# Patient Record
Sex: MALE | Race: BLACK OR AFRICAN AMERICAN | NOT HISPANIC OR LATINO | Employment: OTHER | URBAN - METROPOLITAN AREA
[De-identification: names, ages, dates, MRNs, and addresses within clinical notes are randomized per-mention and may not be internally consistent; named-entity substitution may affect disease eponyms.]

---

## 2019-07-18 ENCOUNTER — APPOINTMENT (OUTPATIENT)
Dept: CT IMAGING | Facility: HOSPITAL | Age: 74
End: 2019-07-18

## 2019-07-18 ENCOUNTER — APPOINTMENT (OUTPATIENT)
Dept: GENERAL RADIOLOGY | Facility: HOSPITAL | Age: 74
End: 2019-07-18

## 2019-07-18 ENCOUNTER — HOSPITAL ENCOUNTER (EMERGENCY)
Facility: HOSPITAL | Age: 74
Discharge: HOME | End: 2019-07-19
Attending: EMERGENCY MEDICINE | Admitting: EMERGENCY MEDICINE

## 2019-07-18 DIAGNOSIS — S30.1XXA ABDOMINAL WALL SEROMA, INITIAL ENCOUNTER: Primary | ICD-10-CM

## 2019-07-18 LAB
ALBUMIN SERPL-MCNC: 4.4 G/DL (ref 3.5–5.2)
ALBUMIN/GLOB SERPL: 1.1 G/DL
ALP SERPL-CCNC: 86 U/L (ref 39–117)
ALT SERPL W P-5'-P-CCNC: 89 U/L (ref 1–41)
ANION GAP SERPL CALCULATED.3IONS-SCNC: 18.4 MMOL/L (ref 5–15)
AST SERPL-CCNC: 81 U/L (ref 1–40)
BASOPHILS # BLD AUTO: 0.04 10*3/MM3 (ref 0–0.2)
BASOPHILS NFR BLD AUTO: 0.4 % (ref 0–1.5)
BILIRUB SERPL-MCNC: 0.5 MG/DL (ref 0.2–1.2)
BILIRUB UR QL STRIP: NEGATIVE
BUN BLD-MCNC: 8 MG/DL (ref 8–23)
BUN/CREAT SERPL: 8.1 (ref 7–25)
CALCIUM SPEC-SCNC: 9.7 MG/DL (ref 8.6–10.5)
CHLORIDE SERPL-SCNC: 97 MMOL/L (ref 98–107)
CLARITY UR: CLEAR
CO2 SERPL-SCNC: 22.6 MMOL/L (ref 22–29)
COLOR UR: YELLOW
CREAT BLD-MCNC: 0.99 MG/DL (ref 0.76–1.27)
DEPRECATED RDW RBC AUTO: 45.3 FL (ref 37–54)
EOSINOPHIL # BLD AUTO: 0 10*3/MM3 (ref 0–0.4)
EOSINOPHIL NFR BLD AUTO: 0 % (ref 0.3–6.2)
ERYTHROCYTE [DISTWIDTH] IN BLOOD BY AUTOMATED COUNT: 13.8 % (ref 12.3–15.4)
GFR SERPL CREATININE-BSD FRML MDRD: 90 ML/MIN/1.73
GLOBULIN UR ELPH-MCNC: 3.9 GM/DL
GLUCOSE BLD-MCNC: 116 MG/DL (ref 65–99)
GLUCOSE UR STRIP-MCNC: NEGATIVE MG/DL
HCT VFR BLD AUTO: 43.7 % (ref 37.5–51)
HGB BLD-MCNC: 14.5 G/DL (ref 13–17.7)
HGB UR QL STRIP.AUTO: NEGATIVE
IMM GRANULOCYTES # BLD AUTO: 0.03 10*3/MM3 (ref 0–0.05)
IMM GRANULOCYTES NFR BLD AUTO: 0.3 % (ref 0–0.5)
INR PPP: 1.04 (ref 0.9–1.1)
KETONES UR QL STRIP: ABNORMAL
LEUKOCYTE ESTERASE UR QL STRIP.AUTO: NEGATIVE
LYMPHOCYTES # BLD AUTO: 3.25 10*3/MM3 (ref 0.7–3.1)
LYMPHOCYTES NFR BLD AUTO: 29.4 % (ref 19.6–45.3)
MCH RBC QN AUTO: 29.8 PG (ref 26.6–33)
MCHC RBC AUTO-ENTMCNC: 33.2 G/DL (ref 31.5–35.7)
MCV RBC AUTO: 89.9 FL (ref 79–97)
MONOCYTES # BLD AUTO: 0.68 10*3/MM3 (ref 0.1–0.9)
MONOCYTES NFR BLD AUTO: 6.2 % (ref 5–12)
NEUTROPHILS # BLD AUTO: 7.04 10*3/MM3 (ref 1.7–7)
NEUTROPHILS NFR BLD AUTO: 63.7 % (ref 42.7–76)
NITRITE UR QL STRIP: NEGATIVE
NRBC BLD AUTO-RTO: 0 /100 WBC (ref 0–0.2)
NT-PROBNP SERPL-MCNC: 50.8 PG/ML (ref 5–900)
PH UR STRIP.AUTO: 6 [PH] (ref 5–8)
PLATELET # BLD AUTO: 169 10*3/MM3 (ref 140–450)
PMV BLD AUTO: 10.7 FL (ref 6–12)
POTASSIUM BLD-SCNC: 3.8 MMOL/L (ref 3.5–5.2)
PROT SERPL-MCNC: 8.3 G/DL (ref 6–8.5)
PROT UR QL STRIP: NEGATIVE
PROTHROMBIN TIME: 13.3 SECONDS (ref 11.7–14.2)
RBC # BLD AUTO: 4.86 10*6/MM3 (ref 4.14–5.8)
SODIUM BLD-SCNC: 138 MMOL/L (ref 136–145)
SP GR UR STRIP: 1.02 (ref 1–1.03)
TROPONIN T SERPL-MCNC: <0.01 NG/ML (ref 0–0.03)
UROBILINOGEN UR QL STRIP: ABNORMAL
WBC NRBC COR # BLD: 11.04 10*3/MM3 (ref 3.4–10.8)

## 2019-07-18 PROCEDURE — 71046 X-RAY EXAM CHEST 2 VIEWS: CPT

## 2019-07-18 PROCEDURE — 93005 ELECTROCARDIOGRAM TRACING: CPT | Performed by: EMERGENCY MEDICINE

## 2019-07-18 PROCEDURE — 81003 URINALYSIS AUTO W/O SCOPE: CPT | Performed by: NURSE PRACTITIONER

## 2019-07-18 PROCEDURE — 80053 COMPREHEN METABOLIC PANEL: CPT | Performed by: NURSE PRACTITIONER

## 2019-07-18 PROCEDURE — 85025 COMPLETE CBC W/AUTO DIFF WBC: CPT | Performed by: NURSE PRACTITIONER

## 2019-07-18 PROCEDURE — 83880 ASSAY OF NATRIURETIC PEPTIDE: CPT | Performed by: NURSE PRACTITIONER

## 2019-07-18 PROCEDURE — 96374 THER/PROPH/DIAG INJ IV PUSH: CPT

## 2019-07-18 PROCEDURE — 25010000002 LORAZEPAM PER 2 MG: Performed by: NURSE PRACTITIONER

## 2019-07-18 PROCEDURE — 93010 ELECTROCARDIOGRAM REPORT: CPT | Performed by: INTERNAL MEDICINE

## 2019-07-18 PROCEDURE — 74177 CT ABD & PELVIS W/CONTRAST: CPT

## 2019-07-18 PROCEDURE — 71275 CT ANGIOGRAPHY CHEST: CPT

## 2019-07-18 PROCEDURE — 84484 ASSAY OF TROPONIN QUANT: CPT | Performed by: NURSE PRACTITIONER

## 2019-07-18 PROCEDURE — 85610 PROTHROMBIN TIME: CPT | Performed by: NURSE PRACTITIONER

## 2019-07-18 PROCEDURE — 93005 ELECTROCARDIOGRAM TRACING: CPT

## 2019-07-18 PROCEDURE — 99283 EMERGENCY DEPT VISIT LOW MDM: CPT

## 2019-07-18 RX ORDER — AMLODIPINE BESYLATE 5 MG/1
5 TABLET ORAL DAILY
COMMUNITY
End: 2019-08-16

## 2019-07-18 RX ORDER — LORAZEPAM 2 MG/ML
0.5 INJECTION INTRAMUSCULAR ONCE
Status: COMPLETED | OUTPATIENT
Start: 2019-07-18 | End: 2019-07-18

## 2019-07-18 RX ORDER — BENAZEPRIL HYDROCHLORIDE 20 MG/1
20 TABLET ORAL DAILY
COMMUNITY
End: 2021-04-22

## 2019-07-18 RX ORDER — SODIUM CHLORIDE 0.9 % (FLUSH) 0.9 %
10 SYRINGE (ML) INJECTION AS NEEDED
Status: DISCONTINUED | OUTPATIENT
Start: 2019-07-18 | End: 2019-07-19 | Stop reason: HOSPADM

## 2019-07-18 RX ORDER — SIMVASTATIN 20 MG
20 TABLET ORAL NIGHTLY
COMMUNITY
End: 2021-04-22

## 2019-07-18 RX ORDER — ASPIRIN 81 MG/1
324 TABLET, CHEWABLE ORAL ONCE
Status: DISCONTINUED | OUTPATIENT
Start: 2019-07-18 | End: 2019-07-19 | Stop reason: HOSPADM

## 2019-07-18 RX ADMIN — SODIUM CHLORIDE 1000 ML: 9 INJECTION, SOLUTION INTRAVENOUS at 23:20

## 2019-07-18 RX ADMIN — LORAZEPAM 0.5 MG: 2 INJECTION INTRAMUSCULAR; INTRAVENOUS at 23:41

## 2019-07-19 VITALS
OXYGEN SATURATION: 94 % | DIASTOLIC BLOOD PRESSURE: 71 MMHG | TEMPERATURE: 97.4 F | HEIGHT: 63 IN | RESPIRATION RATE: 16 BRPM | HEART RATE: 90 BPM | SYSTOLIC BLOOD PRESSURE: 134 MMHG

## 2019-07-19 PROCEDURE — 0 IOPAMIDOL PER 1 ML: Performed by: EMERGENCY MEDICINE

## 2019-07-19 RX ORDER — CEPHALEXIN 500 MG/1
500 CAPSULE ORAL 4 TIMES DAILY
Qty: 20 CAPSULE | Refills: 0 | Status: SHIPPED | OUTPATIENT
Start: 2019-07-19 | End: 2019-07-24

## 2019-07-19 RX ADMIN — IOPAMIDOL 95 ML: 755 INJECTION, SOLUTION INTRAVENOUS at 00:15

## 2019-07-30 ENCOUNTER — OFFICE VISIT (OUTPATIENT)
Dept: SURGERY | Facility: CLINIC | Age: 74
End: 2019-07-30

## 2019-07-30 VITALS — OXYGEN SATURATION: 98 % | BODY MASS INDEX: 31.4 KG/M2 | HEART RATE: 93 BPM | WEIGHT: 177.2 LBS | HEIGHT: 63 IN

## 2019-07-30 DIAGNOSIS — T85.79XA INFECTED HERNIOPLASTY MESH, INITIAL ENCOUNTER (HCC): Primary | ICD-10-CM

## 2019-07-30 DIAGNOSIS — Z12.11 ENCOUNTER FOR SCREENING COLONOSCOPY: ICD-10-CM

## 2019-07-30 PROCEDURE — 99204 OFFICE O/P NEW MOD 45 MIN: CPT | Performed by: SURGERY

## 2019-07-30 RX ORDER — CHLORAL HYDRATE 500 MG
1000 CAPSULE ORAL
COMMUNITY

## 2019-07-30 RX ORDER — CEFAZOLIN SODIUM 2 G/100ML
2 INJECTION, SOLUTION INTRAVENOUS ONCE
Status: CANCELLED | OUTPATIENT
Start: 2019-08-21 | End: 2019-07-30

## 2019-07-30 NOTE — PROGRESS NOTES
Subjective   Reggie Coy is a 74 y.o. male who presents to the office in surgical consultation from Bon Abebe MD for abdominal pain with chronic abdominal drainage.    History of Present Illness     The patient had a previous appendectomy through a midline incision.  He then developed a ventral hernia in that incision and has undergone 2 separate ventral hernia repairs.  His most recent ventral hernia repair was in 2000.  He recovered well from that surgery but does have some intermittent pain involving the midline incision.  Over the past several months he has developed chronic drainage from 2 separate sinus tracts along the abdominal wall.  The drainage is clear at times and cloudy at times.  The pain has also progressively worsened with the drainage.  He was seen in the emergency room on 7/18/2019 with constitutional symptoms including chest pain with shortness of breath and generalized fatigue.  Evaluation was basically normal regarding his heart.  A CT scan of the abdomen and pelvis was performed that shows the mesh from the previous ventral hernia repair with a complicated fluid collection surrounding the mesh.    The patient has a previous history of colonic polyps.  He is not sure of his most recent colonoscopy but believes it was 8 to 10 years ago.  He has not had any diarrhea or constipation.  There has been no melena nor rectal bleeding.    Review of Systems   Constitutional: Negative for activity change, appetite change, fatigue and fever.   HENT: Negative for trouble swallowing and voice change.    Respiratory: Negative for chest tightness and shortness of breath.    Cardiovascular: Negative for chest pain and palpitations.   Gastrointestinal: Positive for abdominal pain. Negative for blood in stool, constipation, diarrhea, nausea and vomiting.   Endocrine: Negative for cold intolerance and heat intolerance.   Genitourinary: Negative for dysuria and flank pain.   Neurological: Negative for  dizziness and light-headedness.   Hematological: Negative for adenopathy. Does not bruise/bleed easily.   Psychiatric/Behavioral: Negative for agitation and confusion.     Past Medical History:   Diagnosis Date   • Hyperlipidemia    • Hypertension      Past Surgical History:   Procedure Laterality Date   • APPENDECTOMY     • HERNIA REPAIR       Family History   Problem Relation Age of Onset   • Heart disease Mother      Social History     Socioeconomic History   • Marital status:      Spouse name: Not on file   • Number of children: Not on file   • Years of education: Not on file   • Highest education level: Not on file   Tobacco Use   • Smoking status: Never Smoker   Substance and Sexual Activity   • Alcohol use: Yes     Comment: Occasional use   • Drug use: Yes     Types: Marijuana       Objective   Physical Exam   Constitutional: He is oriented to person, place, and time. He appears well-developed and well-nourished.  Non-toxic appearance.   HENT:   Head: Normocephalic and atraumatic.   Eyes: EOM are normal. No scleral icterus.   Neck: Normal range of motion. Neck supple.   Pulmonary/Chest: Effort normal. No respiratory distress.   Abdominal: Soft. Normal appearance. There is tenderness (Mild tenderness along the midline incision) in the periumbilical area.   There is a midline incision that is firm to palpation with 2 sinus tracts present.  There is no fluctuance nor surrounding cellulitis.   Neurological: He is alert and oriented to person, place, and time.   Skin: Skin is warm and dry.   Psychiatric: He has a normal mood and affect. His behavior is normal. Judgment and thought content normal.       Assessment/Plan       The primary encounter diagnosis was Infected hernioplasty mesh, initial encounter (CMS/[facility]). A diagnosis of Encounter for screening colonoscopy was also pertinent to this visit.    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  This  will require an abdominal wall exploration with removal of mesh and then a ventral hernia repair, likely requiring component separation with mesh placement.    The patient has a history of colonic polyps and has not had a colonoscopy for 8 to 10 years.  He will need a colonoscopy prior to any abdominal surgeries.    The entire situation was discussed with the patient and his wife.  It was explained that the surgery is a large abdominal surgery that will require a significant recovery including at least a several day hospital stay.  The patient understands the indications, alternatives, risks, and benefits of the procedure and wishes to proceed.

## 2019-08-08 ENCOUNTER — HOSPITAL ENCOUNTER (OUTPATIENT)
Facility: HOSPITAL | Age: 74
Setting detail: HOSPITAL OUTPATIENT SURGERY
Discharge: HOME | End: 2019-08-08
Attending: SURGERY | Admitting: SURGERY

## 2019-08-08 ENCOUNTER — ANESTHESIA (OUTPATIENT)
Dept: GASTROENTEROLOGY | Facility: HOSPITAL | Age: 74
End: 2019-08-08

## 2019-08-08 ENCOUNTER — ANESTHESIA EVENT (OUTPATIENT)
Dept: GASTROENTEROLOGY | Facility: HOSPITAL | Age: 74
End: 2019-08-08

## 2019-08-08 VITALS
RESPIRATION RATE: 16 BRPM | SYSTOLIC BLOOD PRESSURE: 122 MMHG | HEART RATE: 80 BPM | DIASTOLIC BLOOD PRESSURE: 69 MMHG | WEIGHT: 170 LBS | HEIGHT: 63 IN | OXYGEN SATURATION: 99 % | BODY MASS INDEX: 30.12 KG/M2

## 2019-08-08 PROBLEM — Z86.010 HISTORY OF COLON POLYPS: Status: ACTIVE | Noted: 2019-08-08

## 2019-08-08 PROCEDURE — G0105 COLORECTAL SCRN; HI RISK IND: HCPCS | Performed by: SURGERY

## 2019-08-08 PROCEDURE — 25010000002 PROPOFOL 10 MG/ML EMULSION: Performed by: NURSE ANESTHETIST, CERTIFIED REGISTERED

## 2019-08-08 PROCEDURE — 25010000002 PHENYLEPHRINE PER 1 ML: Performed by: NURSE ANESTHETIST, CERTIFIED REGISTERED

## 2019-08-08 RX ORDER — SODIUM CHLORIDE 0.9 % (FLUSH) 0.9 %
3 SYRINGE (ML) INJECTION AS NEEDED
Status: DISCONTINUED | OUTPATIENT
Start: 2019-08-08 | End: 2019-08-08 | Stop reason: HOSPADM

## 2019-08-08 RX ORDER — PROMETHAZINE HYDROCHLORIDE 25 MG/1
25 SUPPOSITORY RECTAL ONCE AS NEEDED
Status: DISCONTINUED | OUTPATIENT
Start: 2019-08-08 | End: 2019-08-08 | Stop reason: HOSPADM

## 2019-08-08 RX ORDER — LIDOCAINE HYDROCHLORIDE 10 MG/ML
0.5 INJECTION, SOLUTION INFILTRATION; PERINEURAL ONCE AS NEEDED
Status: DISCONTINUED | OUTPATIENT
Start: 2019-08-08 | End: 2019-08-08 | Stop reason: HOSPADM

## 2019-08-08 RX ORDER — SODIUM CHLORIDE, SODIUM LACTATE, POTASSIUM CHLORIDE, CALCIUM CHLORIDE 600; 310; 30; 20 MG/100ML; MG/100ML; MG/100ML; MG/100ML
1000 INJECTION, SOLUTION INTRAVENOUS CONTINUOUS
Status: DISCONTINUED | OUTPATIENT
Start: 2019-08-08 | End: 2019-08-08 | Stop reason: HOSPADM

## 2019-08-08 RX ORDER — PROMETHAZINE HYDROCHLORIDE 25 MG/ML
12.5 INJECTION, SOLUTION INTRAMUSCULAR; INTRAVENOUS ONCE AS NEEDED
Status: DISCONTINUED | OUTPATIENT
Start: 2019-08-08 | End: 2019-08-08 | Stop reason: HOSPADM

## 2019-08-08 RX ORDER — PROPOFOL 10 MG/ML
VIAL (ML) INTRAVENOUS CONTINUOUS PRN
Status: DISCONTINUED | OUTPATIENT
Start: 2019-08-08 | End: 2019-08-08 | Stop reason: SURG

## 2019-08-08 RX ORDER — LIDOCAINE HYDROCHLORIDE 20 MG/ML
INJECTION, SOLUTION INFILTRATION; PERINEURAL AS NEEDED
Status: DISCONTINUED | OUTPATIENT
Start: 2019-08-08 | End: 2019-08-08 | Stop reason: SURG

## 2019-08-08 RX ORDER — PROPOFOL 10 MG/ML
VIAL (ML) INTRAVENOUS AS NEEDED
Status: DISCONTINUED | OUTPATIENT
Start: 2019-08-08 | End: 2019-08-08 | Stop reason: SURG

## 2019-08-08 RX ORDER — PROMETHAZINE HYDROCHLORIDE 25 MG/1
25 TABLET ORAL ONCE AS NEEDED
Status: DISCONTINUED | OUTPATIENT
Start: 2019-08-08 | End: 2019-08-08 | Stop reason: HOSPADM

## 2019-08-08 RX ADMIN — PROPOFOL 50 MG: 10 INJECTION, EMULSION INTRAVENOUS at 10:30

## 2019-08-08 RX ADMIN — PHENYLEPHRINE HYDROCHLORIDE 100 MCG: 10 INJECTION INTRAVENOUS at 10:42

## 2019-08-08 RX ADMIN — PHENYLEPHRINE HYDROCHLORIDE 100 MCG: 10 INJECTION INTRAVENOUS at 10:47

## 2019-08-08 RX ADMIN — LIDOCAINE HYDROCHLORIDE 60 MG: 20 INJECTION, SOLUTION INFILTRATION; PERINEURAL at 10:28

## 2019-08-08 RX ADMIN — SODIUM CHLORIDE, POTASSIUM CHLORIDE, SODIUM LACTATE AND CALCIUM CHLORIDE 1000 ML: 600; 310; 30; 20 INJECTION, SOLUTION INTRAVENOUS at 09:20

## 2019-08-08 RX ADMIN — PHENYLEPHRINE HYDROCHLORIDE 100 MCG: 10 INJECTION INTRAVENOUS at 10:52

## 2019-08-08 RX ADMIN — PROPOFOL 200 MCG/KG/MIN: 10 INJECTION, EMULSION INTRAVENOUS at 10:28

## 2019-08-08 RX ADMIN — PROPOFOL 50 MG: 10 INJECTION, EMULSION INTRAVENOUS at 10:32

## 2019-08-08 RX ADMIN — PROPOFOL 100 MG: 10 INJECTION, EMULSION INTRAVENOUS at 10:28

## 2019-08-08 RX ADMIN — PHENYLEPHRINE HYDROCHLORIDE 100 MCG: 10 INJECTION INTRAVENOUS at 10:37

## 2019-08-08 NOTE — ANESTHESIA PREPROCEDURE EVALUATION
Anesthesia Evaluation     Patient summary reviewed and Nursing notes reviewed   NPO Solid Status: > 8 hours  NPO Liquid Status: > 4 hours           Airway   Dental      Pulmonary    Cardiovascular     (+) hypertension 2 medications or greater,       Neuro/Psych  GI/Hepatic/Renal/Endo      Musculoskeletal     Abdominal    Substance History      OB/GYN          Other                        Anesthesia Plan    ASA 3     MAC     Anesthetic plan, all risks, benefits, and alternatives have been provided, discussed and informed consent has been obtained with: patient.

## 2019-08-08 NOTE — DISCHARGE INSTRUCTIONS
Colonoscopy, Adult, Care After  DR PERKINS [phone]  This sheet gives you information about how to care for yourself after your procedure. Your health care provider may also give you more specific instructions. If you have problems or questions, contact your health care provider.  What can I expect after the procedure?  After the procedure, it is common to have:  · A small amount of blood in your stool for 24 hours after the procedure.  · Some gas.  · Mild abdominal cramping or bloating.  Follow these instructions at home:  General instructions  · For the first 24 hours after the procedure:  ? Do not drive or use machinery.  ? Do not sign important documents.  ? Do not drink alcohol.  ? Do your regular daily activities at a slower pace than normal.  ? Eat soft, easy-to-digest foods.  · Take over-the-counter or prescription medicines only as told by your health care provider.  Relieving cramping and bloating    · Try walking around when you have cramps or feel bloated.  · Apply heat to your abdomen as told by your health care provider. Use a heat source that your health care provider recommends, such as a moist heat pack or a heating pad.  ? Place a towel between your skin and the heat source.  ? Leave the heat on for 20-30 minutes.  ? Remove the heat if your skin turns bright red. This is especially important if you are unable to feel pain, heat, or cold. You may have a greater risk of getting burned.  Eating and drinking    · Drink enough fluid to keep your urine pale yellow.  · Resume your normal diet as instructed by your health care provider. Avoid heavy or fried foods that are hard to digest.  · Avoid drinking alcohol for as long as instructed by your health care provider.  Contact a health care provider if:  · You have blood in your stool 2-3 days after the procedure.  Get help right away if:  · You have more than a small spotting of blood in your stool.  · You pass large blood clots in your stool.  · Your  abdomen is swollen.  · You have nausea or vomiting.  · You have a fever.  · You have increasing abdominal pain that is not relieved with medicine.  Summary  · After the procedure, it is common to have a small amount of blood in your stool. You may also have mild abdominal cramping and bloating.  · For the first 24 hours after the procedure, do not drive or use machinery, sign important documents, or drink alcohol.  · Contact your health care provider if you have a lot of blood in your stool, nausea or vomiting, a fever, or increased abdominal pain.  This information is not intended to replace advice given to you by your health care provider. Make sure you discuss any questions you have with your health care provider.  Document Released: 08/01/2005 Document Revised: 10/10/2018 Document Reviewed: 02/28/2017  [employer] Interactive Patient Education © 2019 [employer]

## 2019-08-08 NOTE — ANESTHESIA POSTPROCEDURE EVALUATION
"Patient: Reggie Coy    Procedure Summary     Date:  08/08/19 Room / Location:  [facility] ENDOSCOPY 8 /  [facility]    Anesthesia Start:  1023 Anesthesia Stop:  1058    Procedure:  COLONOSCOPY to Cecum (N/A ) Diagnosis:       Encounter for screening colonoscopy      (Encounter for screening colonoscopy [Z12.11])    Surgeon:  Bon Kong Jr., MD Provider:  Amy Enriquez MD    Anesthesia Type:  MAC ASA Status:  3          Anesthesia Type: MAC  Last vitals  BP   122/69 (08/08/19 1122)   Temp       Pulse   80 (08/08/19 1122)   Resp   16 (08/08/19 1122)     SpO2   99 % (08/08/19 1122)     Post Anesthesia Care and Evaluation    Patient location during evaluation: PACU  Patient participation: complete - patient participated  Level of consciousness: awake  Pain score: 0  Pain management: adequate  Airway patency: patent  Anesthetic complications: No anesthetic complications    Cardiovascular status: acceptable  Respiratory status: acceptable  Hydration status: acceptable    Comments: Blood pressure 122/69, pulse 80, resp. rate 16, height 160 cm (63\"), weight 77.1 kg (170 lb), SpO2 99 %.    No anesthesia care post op    "

## 2019-08-08 NOTE — OP NOTE
Surgeon:     Bon Kong Jr., M.D.    Preoperative Diagnosis:     1.  Need for screening colonoscopy  2.  History of colonic polyps    Postoperative Diagnosis:     1.  Diverticulosis    Procedure Performed:     1.  Colonoscopy    Indications:     The patient is a pleasant 74-year-old male with a history of colonic polyps.  He presents for screening colonoscopy at high risk group.  The patient understands the indications, alternatives, risks, and benefits of the procedure and wishes to proceed.    Procedure:     The patient was identified, taken to the endoscopy suite, and place in the left side down decubitus procedure.  The patient underwent a MAC anesthesia and was appropriately monitored through the case by the anesthesia personnel.  A rectal exam was performed that was normal.  The colonoscope was introduced into the rectum and advanced very carefully to the cecum that was identified by the cecal strap, ileocecal valve, and the appendiceal orifice.  The scope was then slowly withdrawn with careful circumferential examination of the mucosa performed.  The bowel prep was good allowing adequate visualization of mucosal surfaces.  The scope was withdrawn.  The patient tolerated the procedure well and was transferred the recovery area in stable condition.    Findings:    There was diverticulosis involving the sigmoid colon.  No other abnormalities were present.    Recommendations:     1.  High-fiber diet.  2.  Repeat colonoscopy in 5 years based on his history of colonic polyps.    Bon Kong Jr., M.D.

## 2019-08-16 ENCOUNTER — APPOINTMENT (OUTPATIENT)
Dept: PREADMISSION TESTING | Facility: HOSPITAL | Age: 74
End: 2019-08-16

## 2019-08-16 VITALS
TEMPERATURE: 97.6 F | SYSTOLIC BLOOD PRESSURE: 130 MMHG | HEART RATE: 88 BPM | WEIGHT: 170 LBS | RESPIRATION RATE: 20 BRPM | DIASTOLIC BLOOD PRESSURE: 73 MMHG | OXYGEN SATURATION: 95 % | HEIGHT: 63 IN | BODY MASS INDEX: 30.12 KG/M2

## 2019-08-16 LAB
ANION GAP SERPL CALCULATED.3IONS-SCNC: 16.3 MMOL/L (ref 5–15)
BUN BLD-MCNC: 6 MG/DL (ref 8–23)
BUN/CREAT SERPL: 7.2 (ref 7–25)
CALCIUM SPEC-SCNC: 8.7 MG/DL (ref 8.6–10.5)
CHLORIDE SERPL-SCNC: 100 MMOL/L (ref 98–107)
CO2 SERPL-SCNC: 23.7 MMOL/L (ref 22–29)
CREAT BLD-MCNC: 0.83 MG/DL (ref 0.76–1.27)
DEPRECATED RDW RBC AUTO: 51.2 FL (ref 37–54)
ERYTHROCYTE [DISTWIDTH] IN BLOOD BY AUTOMATED COUNT: 14.8 % (ref 12.3–15.4)
GFR SERPL CREATININE-BSD FRML MDRD: 110 ML/MIN/1.73
GLUCOSE BLD-MCNC: 85 MG/DL (ref 65–99)
HCT VFR BLD AUTO: 41.7 % (ref 37.5–51)
HGB BLD-MCNC: 13.7 G/DL (ref 13–17.7)
MCH RBC QN AUTO: 30.6 PG (ref 26.6–33)
MCHC RBC AUTO-ENTMCNC: 32.9 G/DL (ref 31.5–35.7)
MCV RBC AUTO: 93.1 FL (ref 79–97)
PLATELET # BLD AUTO: 200 10*3/MM3 (ref 140–450)
PMV BLD AUTO: 10.1 FL (ref 6–12)
POTASSIUM BLD-SCNC: 4.1 MMOL/L (ref 3.5–5.2)
RBC # BLD AUTO: 4.48 10*6/MM3 (ref 4.14–5.8)
SODIUM BLD-SCNC: 140 MMOL/L (ref 136–145)
WBC NRBC COR # BLD: 9 10*3/MM3 (ref 3.4–10.8)

## 2019-08-16 PROCEDURE — 36415 COLL VENOUS BLD VENIPUNCTURE: CPT

## 2019-08-16 PROCEDURE — 80048 BASIC METABOLIC PNL TOTAL CA: CPT | Performed by: SURGERY

## 2019-08-16 PROCEDURE — 85027 COMPLETE CBC AUTOMATED: CPT | Performed by: SURGERY

## 2019-08-16 RX ORDER — LANOLIN ALCOHOL/MO/W.PET/CERES
100 CREAM (GRAM) TOPICAL DAILY
COMMUNITY
Start: 2019-01-05 | End: 2022-10-17

## 2019-08-16 RX ORDER — ACETAMINOPHEN 500 MG
1 TABLET ORAL AS NEEDED
COMMUNITY
End: 2021-04-22

## 2019-08-16 RX ORDER — BENAZEPRIL HYDROCHLORIDE 10 MG/1
10 TABLET ORAL DAILY
COMMUNITY
End: 2019-08-16 | Stop reason: SDUPTHER

## 2019-08-16 RX ORDER — AMLODIPINE BESYLATE 5 MG/1
5 TABLET ORAL DAILY
COMMUNITY
Start: 2019-01-05 | End: 2021-04-22

## 2019-08-16 NOTE — DISCHARGE INSTRUCTIONS
Take the following medications the morning of surgery with a small sip of water: TAKE BLOOD PRESSURE MEDS AM OF SURGERY    PLEASE ARRIVE AT THE  HOSPITAL AT: 1:30 PM    General Instructions:  • Do not eat solid food after midnight the night before surgery.  • You may drink clear liquids day of surgery but must stop at least one hour before your hospital arrival time.  • It is beneficial for you to have a clear drink that contains carbohydrates the day of surgery.  We suggest a 12 to 20 ounce bottle of Gatorade or Powerade for non-diabetic patients or a 12 to 20 ounce bottle of G2 or Powerade Zero for diabetic patients. (Pediatric patients, are not advised to drink a 12 to 20 ounce carbohydrate drink)    Clear liquids are liquids you can see through.  Nothing red in color.     Plain water                               Sports drinks  Sodas                                   Gelatin (Jell-O)  Fruit juices without pulp such as white grape juice and apple juice  Popsicles that contain no fruit or yogurt  Tea or coffee (no cream or milk added)  Gatorade / Powerade  G2 / Powerade Zero    • Infants may have breast milk up to four hours before surgery.  • Infants drinking formula may drink formula up to six hours before surgery.   • Patients who avoid smoking, chewing tobacco and alcohol for 4 weeks prior to surgery have a reduced risk of post-operative complications.  Quit smoking as many days before surgery as you can.  • Do not smoke, use chewing tobacco or drink alcohol the day of surgery.   • If applicable bring your C-PAP/ BI-PAP machine.  • Bring any papers given to you in the doctor’s office.  • Wear clean comfortable clothes and socks.  • Do not wear contact lenses, false eyelashes or make-up.  Bring a case for your glasses.   • Bring crutches or walker if applicable.  • Remove all piercings.  Leave jewelry and any other valuables at home.  • Hair extensions with metal clips must be removed prior to surgery.  • The  Pre-Admission Testing nurse will instruct you to bring medications if unable to obtain an accurate list in Pre-Admission Testing.        If you were given a blood bank ID arm band remember to bring it with you the day of surgery.    Preventing a Surgical Site Infection:  • For 2 to 3 days before surgery, avoid shaving with a razor because the razor can irritate skin and make it easier to develop an infection.    • Any areas of open skin can increase the risk of a post-operative wound infection by allowing bacteria to enter and travel throughout the body.  Notify your surgeon if you have any skin wounds / rashes even if it is not near the expected surgical site.  The area will need assessed to determine if surgery should be delayed until it is healed.  • The night prior to surgery sleep in a clean bed with clean clothing.  Do not allow pets to sleep with you.  • Shower on the morning of surgery using a fresh bar of anti-bacterial soap (such as Dial) and clean washcloth.  Dry with a clean towel and dress in clean clothing.  • Ask your surgeon if you will be receiving antibiotics prior to surgery.  • Make sure you, your family, and all healthcare providers clean their hands with soap and water or an alcohol based hand  before caring for you or your wound.    Day of surgery:  Upon arrival, a Pre-op nurse and Anesthesiologist will review your health history, obtain vital signs, and answer questions you may have.  The only belongings needed at this time will be a list of your home medications and if applicable your C-PAP/BI-PAP machine.  If you are staying overnight your family can leave the rest of your belongings in the car and bring them to your room later.  A Pre-op nurse will start an IV and you may receive medication in preparation for surgery, including something to help you relax.  Your family will be able to see you in the Pre-op area.  While you are in surgery your family should notify the waiting room   if they leave the waiting room area and provide a contact phone number.    Please be aware that surgery does come with discomfort.  We want to make every effort to control your discomfort so please discuss any uncontrolled symptoms with your nurse.   Your doctor will most likely have prescribed pain medications.      If you are going home after surgery you will receive individualized written care instructions before being discharged.  A responsible adult must drive you to and from the hospital on the day of your surgery and stay with you for 24 hours.    If you are staying overnight following surgery, you will be transported to your hospital room following the recovery period.  [facility] has all private rooms.    You have received a list of surgical assistants for your reference.  If you have any questions please call Pre-Admission Testing at [phone].  Deductibles and co-payments are collected on the day of service. Please be prepared to pay the required co-pay, deductible or deposit on the day of service as defined by your plan.

## 2019-08-21 ENCOUNTER — ANESTHESIA EVENT (OUTPATIENT)
Dept: PERIOP | Facility: HOSPITAL | Age: 74
End: 2019-08-21

## 2019-08-21 ENCOUNTER — HOSPITAL ENCOUNTER (INPATIENT)
Facility: HOSPITAL | Age: 74
LOS: 1 days | Discharge: HOME | End: 2019-08-22
Attending: SURGERY | Admitting: SURGERY

## 2019-08-21 ENCOUNTER — ANESTHESIA (OUTPATIENT)
Dept: PERIOP | Facility: HOSPITAL | Age: 74
End: 2019-08-21

## 2019-08-21 DIAGNOSIS — T85.79XA INFECTED HERNIOPLASTY MESH, INITIAL ENCOUNTER (HCC): ICD-10-CM

## 2019-08-21 PROCEDURE — 25010000002 MIDAZOLAM PER 1 MG: Performed by: ANESTHESIOLOGY

## 2019-08-21 PROCEDURE — C9290 INJ, BUPIVACAINE LIPOSOME: HCPCS | Performed by: SURGERY

## 2019-08-21 PROCEDURE — 87205 SMEAR GRAM STAIN: CPT | Performed by: SURGERY

## 2019-08-21 PROCEDURE — 49402 REMOVE FOREIGN BODY ADBOMEN: CPT | Performed by: SURGERY

## 2019-08-21 PROCEDURE — 25010000002 ONDANSETRON PER 1 MG: Performed by: NURSE ANESTHETIST, CERTIFIED REGISTERED

## 2019-08-21 PROCEDURE — 25010000003 BUPIVACAINE LIPOSOME 1.3 % SUSPENSION: Performed by: SURGERY

## 2019-08-21 PROCEDURE — 0WPF0JZ REMOVAL OF SYNTHETIC SUBSTITUTE FROM ABDOMINAL WALL, OPEN APPROACH: ICD-10-PCS | Performed by: SURGERY

## 2019-08-21 PROCEDURE — 0DJ00ZZ INSPECTION OF UPPER INTESTINAL TRACT, OPEN APPROACH: ICD-10-PCS | Performed by: SURGERY

## 2019-08-21 PROCEDURE — 87186 SC STD MICRODIL/AGAR DIL: CPT | Performed by: SURGERY

## 2019-08-21 PROCEDURE — 87070 CULTURE OTHR SPECIMN AEROBIC: CPT | Performed by: SURGERY

## 2019-08-21 PROCEDURE — 25010000002 NEOSTIGMINE PER 0.5 MG: Performed by: NURSE ANESTHETIST, CERTIFIED REGISTERED

## 2019-08-21 PROCEDURE — 25010000002 FENTANYL CITRATE (PF) 100 MCG/2ML SOLUTION: Performed by: NURSE ANESTHETIST, CERTIFIED REGISTERED

## 2019-08-21 PROCEDURE — 25010000002 PROPOFOL 10 MG/ML EMULSION: Performed by: NURSE ANESTHETIST, CERTIFIED REGISTERED

## 2019-08-21 PROCEDURE — 25010000002 DEXAMETHASONE PER 1 MG: Performed by: NURSE ANESTHETIST, CERTIFIED REGISTERED

## 2019-08-21 PROCEDURE — 25010000003 CEFAZOLIN IN DEXTROSE 2-4 GM/100ML-% SOLUTION: Performed by: SURGERY

## 2019-08-21 RX ORDER — FENTANYL CITRATE 50 UG/ML
INJECTION, SOLUTION INTRAMUSCULAR; INTRAVENOUS AS NEEDED
Status: DISCONTINUED | OUTPATIENT
Start: 2019-08-21 | End: 2019-08-21 | Stop reason: SURG

## 2019-08-21 RX ORDER — LABETALOL HYDROCHLORIDE 5 MG/ML
5 INJECTION, SOLUTION INTRAVENOUS
Status: DISCONTINUED | OUTPATIENT
Start: 2019-08-21 | End: 2019-08-21 | Stop reason: HOSPADM

## 2019-08-21 RX ORDER — MIDAZOLAM HYDROCHLORIDE 1 MG/ML
2 INJECTION INTRAMUSCULAR; INTRAVENOUS
Status: DISCONTINUED | OUTPATIENT
Start: 2019-08-21 | End: 2019-08-21 | Stop reason: HOSPADM

## 2019-08-21 RX ORDER — NALOXONE HCL 0.4 MG/ML
0.2 VIAL (ML) INJECTION AS NEEDED
Status: DISCONTINUED | OUTPATIENT
Start: 2019-08-21 | End: 2019-08-21 | Stop reason: HOSPADM

## 2019-08-21 RX ORDER — ONDANSETRON 2 MG/ML
4 INJECTION INTRAMUSCULAR; INTRAVENOUS EVERY 6 HOURS PRN
Status: DISCONTINUED | OUTPATIENT
Start: 2019-08-21 | End: 2019-08-22 | Stop reason: HOSPADM

## 2019-08-21 RX ORDER — OXYCODONE HYDROCHLORIDE AND ACETAMINOPHEN 5; 325 MG/1; MG/1
1 TABLET ORAL EVERY 4 HOURS PRN
Status: DISCONTINUED | OUTPATIENT
Start: 2019-08-21 | End: 2019-08-22 | Stop reason: HOSPADM

## 2019-08-21 RX ORDER — OXYCODONE AND ACETAMINOPHEN 7.5; 325 MG/1; MG/1
1 TABLET ORAL ONCE AS NEEDED
Status: DISCONTINUED | OUTPATIENT
Start: 2019-08-21 | End: 2019-08-21 | Stop reason: HOSPADM

## 2019-08-21 RX ORDER — EPHEDRINE SULFATE 50 MG/ML
5 INJECTION, SOLUTION INTRAVENOUS ONCE AS NEEDED
Status: DISCONTINUED | OUTPATIENT
Start: 2019-08-21 | End: 2019-08-21 | Stop reason: HOSPADM

## 2019-08-21 RX ORDER — ONDANSETRON 4 MG/1
4 TABLET, FILM COATED ORAL EVERY 6 HOURS PRN
Status: DISCONTINUED | OUTPATIENT
Start: 2019-08-21 | End: 2019-08-22 | Stop reason: HOSPADM

## 2019-08-21 RX ORDER — HYDROMORPHONE HYDROCHLORIDE 1 MG/ML
0.5 INJECTION, SOLUTION INTRAMUSCULAR; INTRAVENOUS; SUBCUTANEOUS
Status: DISCONTINUED | OUTPATIENT
Start: 2019-08-21 | End: 2019-08-21 | Stop reason: HOSPADM

## 2019-08-21 RX ORDER — SODIUM CHLORIDE, SODIUM LACTATE, POTASSIUM CHLORIDE, CALCIUM CHLORIDE 600; 310; 30; 20 MG/100ML; MG/100ML; MG/100ML; MG/100ML
9 INJECTION, SOLUTION INTRAVENOUS CONTINUOUS
Status: DISCONTINUED | OUTPATIENT
Start: 2019-08-21 | End: 2019-08-21

## 2019-08-21 RX ORDER — PROMETHAZINE HYDROCHLORIDE 25 MG/1
25 TABLET ORAL ONCE AS NEEDED
Status: DISCONTINUED | OUTPATIENT
Start: 2019-08-21 | End: 2019-08-21 | Stop reason: HOSPADM

## 2019-08-21 RX ORDER — HYDROCODONE BITARTRATE AND ACETAMINOPHEN 7.5; 325 MG/1; MG/1
1 TABLET ORAL ONCE AS NEEDED
Status: DISCONTINUED | OUTPATIENT
Start: 2019-08-21 | End: 2019-08-21 | Stop reason: HOSPADM

## 2019-08-21 RX ORDER — DEXAMETHASONE SODIUM PHOSPHATE 10 MG/ML
INJECTION INTRAMUSCULAR; INTRAVENOUS AS NEEDED
Status: DISCONTINUED | OUTPATIENT
Start: 2019-08-21 | End: 2019-08-21 | Stop reason: SURG

## 2019-08-21 RX ORDER — BUPIVACAINE HYDROCHLORIDE AND EPINEPHRINE 5; 5 MG/ML; UG/ML
INJECTION, SOLUTION PERINEURAL AS NEEDED
Status: DISCONTINUED | OUTPATIENT
Start: 2019-08-21 | End: 2019-08-22 | Stop reason: HOSPADM

## 2019-08-21 RX ORDER — LIDOCAINE HYDROCHLORIDE 10 MG/ML
0.5 INJECTION, SOLUTION EPIDURAL; INFILTRATION; INTRACAUDAL; PERINEURAL ONCE AS NEEDED
Status: DISCONTINUED | OUTPATIENT
Start: 2019-08-21 | End: 2019-08-21 | Stop reason: HOSPADM

## 2019-08-21 RX ORDER — HYDRALAZINE HYDROCHLORIDE 20 MG/ML
5 INJECTION INTRAMUSCULAR; INTRAVENOUS
Status: DISCONTINUED | OUTPATIENT
Start: 2019-08-21 | End: 2019-08-21 | Stop reason: HOSPADM

## 2019-08-21 RX ORDER — FENTANYL CITRATE 50 UG/ML
50 INJECTION, SOLUTION INTRAMUSCULAR; INTRAVENOUS
Status: DISCONTINUED | OUTPATIENT
Start: 2019-08-21 | End: 2019-08-21 | Stop reason: HOSPADM

## 2019-08-21 RX ORDER — ONDANSETRON 2 MG/ML
4 INJECTION INTRAMUSCULAR; INTRAVENOUS ONCE AS NEEDED
Status: DISCONTINUED | OUTPATIENT
Start: 2019-08-21 | End: 2019-08-21 | Stop reason: HOSPADM

## 2019-08-21 RX ORDER — ACETAMINOPHEN 325 MG/1
650 TABLET ORAL ONCE AS NEEDED
Status: DISCONTINUED | OUTPATIENT
Start: 2019-08-21 | End: 2019-08-21 | Stop reason: HOSPADM

## 2019-08-21 RX ORDER — LIDOCAINE HYDROCHLORIDE 20 MG/ML
INJECTION, SOLUTION INFILTRATION; PERINEURAL AS NEEDED
Status: DISCONTINUED | OUTPATIENT
Start: 2019-08-21 | End: 2019-08-21 | Stop reason: SURG

## 2019-08-21 RX ORDER — SODIUM CHLORIDE, SODIUM LACTATE, POTASSIUM CHLORIDE, CALCIUM CHLORIDE 600; 310; 30; 20 MG/100ML; MG/100ML; MG/100ML; MG/100ML
50 INJECTION, SOLUTION INTRAVENOUS CONTINUOUS
Status: DISCONTINUED | OUTPATIENT
Start: 2019-08-21 | End: 2019-08-22

## 2019-08-21 RX ORDER — GLYCOPYRROLATE 0.2 MG/ML
INJECTION INTRAMUSCULAR; INTRAVENOUS AS NEEDED
Status: DISCONTINUED | OUTPATIENT
Start: 2019-08-21 | End: 2019-08-21 | Stop reason: SURG

## 2019-08-21 RX ORDER — FLUMAZENIL 0.1 MG/ML
0.2 INJECTION INTRAVENOUS AS NEEDED
Status: DISCONTINUED | OUTPATIENT
Start: 2019-08-21 | End: 2019-08-21 | Stop reason: HOSPADM

## 2019-08-21 RX ORDER — SODIUM CHLORIDE 0.9 % (FLUSH) 0.9 %
1-10 SYRINGE (ML) INJECTION AS NEEDED
Status: DISCONTINUED | OUTPATIENT
Start: 2019-08-21 | End: 2019-08-21 | Stop reason: HOSPADM

## 2019-08-21 RX ORDER — CEFAZOLIN SODIUM 2 G/100ML
2 INJECTION, SOLUTION INTRAVENOUS ONCE
Status: COMPLETED | OUTPATIENT
Start: 2019-08-21 | End: 2019-08-21

## 2019-08-21 RX ORDER — AMLODIPINE BESYLATE 5 MG/1
5 TABLET ORAL DAILY
Status: DISCONTINUED | OUTPATIENT
Start: 2019-08-21 | End: 2019-08-22 | Stop reason: HOSPADM

## 2019-08-21 RX ORDER — ONDANSETRON 2 MG/ML
INJECTION INTRAMUSCULAR; INTRAVENOUS AS NEEDED
Status: DISCONTINUED | OUTPATIENT
Start: 2019-08-21 | End: 2019-08-21 | Stop reason: SURG

## 2019-08-21 RX ORDER — LISINOPRIL 20 MG/1
20 TABLET ORAL
Status: DISCONTINUED | OUTPATIENT
Start: 2019-08-21 | End: 2019-08-22 | Stop reason: HOSPADM

## 2019-08-21 RX ORDER — DIPHENHYDRAMINE HYDROCHLORIDE 50 MG/ML
12.5 INJECTION INTRAMUSCULAR; INTRAVENOUS
Status: DISCONTINUED | OUTPATIENT
Start: 2019-08-21 | End: 2019-08-21 | Stop reason: HOSPADM

## 2019-08-21 RX ORDER — PROMETHAZINE HYDROCHLORIDE 25 MG/ML
12.5 INJECTION, SOLUTION INTRAMUSCULAR; INTRAVENOUS ONCE AS NEEDED
Status: DISCONTINUED | OUTPATIENT
Start: 2019-08-21 | End: 2019-08-21 | Stop reason: HOSPADM

## 2019-08-21 RX ORDER — ROCURONIUM BROMIDE 10 MG/ML
INJECTION, SOLUTION INTRAVENOUS AS NEEDED
Status: DISCONTINUED | OUTPATIENT
Start: 2019-08-21 | End: 2019-08-21 | Stop reason: SURG

## 2019-08-21 RX ORDER — FAMOTIDINE 10 MG/ML
20 INJECTION, SOLUTION INTRAVENOUS ONCE
Status: COMPLETED | OUTPATIENT
Start: 2019-08-21 | End: 2019-08-21

## 2019-08-21 RX ORDER — PROPOFOL 10 MG/ML
VIAL (ML) INTRAVENOUS AS NEEDED
Status: DISCONTINUED | OUTPATIENT
Start: 2019-08-21 | End: 2019-08-21 | Stop reason: SURG

## 2019-08-21 RX ORDER — DIPHENHYDRAMINE HCL 25 MG
25 CAPSULE ORAL
Status: DISCONTINUED | OUTPATIENT
Start: 2019-08-21 | End: 2019-08-21 | Stop reason: HOSPADM

## 2019-08-21 RX ORDER — MIDAZOLAM HYDROCHLORIDE 1 MG/ML
1 INJECTION INTRAMUSCULAR; INTRAVENOUS
Status: DISCONTINUED | OUTPATIENT
Start: 2019-08-21 | End: 2019-08-21 | Stop reason: HOSPADM

## 2019-08-21 RX ORDER — PROMETHAZINE HYDROCHLORIDE 25 MG/1
25 SUPPOSITORY RECTAL ONCE AS NEEDED
Status: DISCONTINUED | OUTPATIENT
Start: 2019-08-21 | End: 2019-08-21 | Stop reason: HOSPADM

## 2019-08-21 RX ORDER — PROMETHAZINE HYDROCHLORIDE 25 MG/ML
6.25 INJECTION, SOLUTION INTRAMUSCULAR; INTRAVENOUS
Status: DISCONTINUED | OUTPATIENT
Start: 2019-08-21 | End: 2019-08-21 | Stop reason: HOSPADM

## 2019-08-21 RX ORDER — NALOXONE HCL 0.4 MG/ML
0.1 VIAL (ML) INJECTION
Status: DISCONTINUED | OUTPATIENT
Start: 2019-08-21 | End: 2019-08-22 | Stop reason: HOSPADM

## 2019-08-21 RX ADMIN — OXYCODONE HYDROCHLORIDE AND ACETAMINOPHEN 1 TABLET: 5; 325 TABLET ORAL at 19:19

## 2019-08-21 RX ADMIN — LISINOPRIL 20 MG: 20 TABLET ORAL at 15:31

## 2019-08-21 RX ADMIN — FENTANYL CITRATE 50 MCG: 50 INJECTION INTRAMUSCULAR; INTRAVENOUS at 09:43

## 2019-08-21 RX ADMIN — ROCURONIUM BROMIDE 40 MG: 10 INJECTION INTRAVENOUS at 08:08

## 2019-08-21 RX ADMIN — PROPOFOL 200 MG: 10 INJECTION, EMULSION INTRAVENOUS at 08:08

## 2019-08-21 RX ADMIN — GLYCOPYRROLATE 0.6 MG: 0.2 INJECTION INTRAMUSCULAR; INTRAVENOUS at 09:32

## 2019-08-21 RX ADMIN — NEOSTIGMINE METHYLSULFATE 3 MG: 1 INJECTION INTRAMUSCULAR; INTRAVENOUS; SUBCUTANEOUS at 09:32

## 2019-08-21 RX ADMIN — MIDAZOLAM 1 MG: 1 INJECTION INTRAMUSCULAR; INTRAVENOUS at 06:59

## 2019-08-21 RX ADMIN — LIDOCAINE HYDROCHLORIDE 100 MG: 20 INJECTION, SOLUTION INFILTRATION; PERINEURAL at 08:08

## 2019-08-21 RX ADMIN — CEFAZOLIN SODIUM 2 G: 2 INJECTION, SOLUTION INTRAVENOUS at 08:12

## 2019-08-21 RX ADMIN — ROCURONIUM BROMIDE 10 MG: 10 INJECTION INTRAVENOUS at 08:52

## 2019-08-21 RX ADMIN — DEXAMETHASONE SODIUM PHOSPHATE 4 MG: 10 INJECTION INTRAMUSCULAR; INTRAVENOUS at 08:18

## 2019-08-21 RX ADMIN — SODIUM CHLORIDE, POTASSIUM CHLORIDE, SODIUM LACTATE AND CALCIUM CHLORIDE 50 ML/HR: 600; 310; 30; 20 INJECTION, SOLUTION INTRAVENOUS at 15:32

## 2019-08-21 RX ADMIN — SODIUM CHLORIDE, POTASSIUM CHLORIDE, SODIUM LACTATE AND CALCIUM CHLORIDE: 600; 310; 30; 20 INJECTION, SOLUTION INTRAVENOUS at 09:33

## 2019-08-21 RX ADMIN — ONDANSETRON 4 MG: 2 INJECTION INTRAMUSCULAR; INTRAVENOUS at 09:32

## 2019-08-21 RX ADMIN — SODIUM CHLORIDE, POTASSIUM CHLORIDE, SODIUM LACTATE AND CALCIUM CHLORIDE 9 ML/HR: 600; 310; 30; 20 INJECTION, SOLUTION INTRAVENOUS at 06:58

## 2019-08-21 RX ADMIN — AMLODIPINE BESYLATE 5 MG: 5 TABLET ORAL at 15:31

## 2019-08-21 RX ADMIN — FENTANYL CITRATE 100 MCG: 50 INJECTION INTRAMUSCULAR; INTRAVENOUS at 08:05

## 2019-08-21 RX ADMIN — FAMOTIDINE 20 MG: 10 INJECTION INTRAVENOUS at 06:58

## 2019-08-21 NOTE — PLAN OF CARE
Problem: Patient Care Overview  Goal: Plan of Care Review  Outcome: Ongoing (interventions implemented as appropriate)   08/21/19 1742   Coping/Psychosocial   Plan of Care Reviewed With patient   Plan of Care Review   Progress no change   OTHER   Outcome Summary Pt had infected hernioplasty mesh removed today. Todd in place, due to be taken out at 0600 on 8/22. No c/o pain. Pt wishing to eat real food soon. LR @ 50cc/hr. VSS. Will continue to monitor.     Goal: Individualization and Mutuality   08/21/19 1742   Individualization   Patient Specific Goals (Include Timeframe) advance diet as soon as poss   08/21/19 1742   Individualization   Patient Specific Goals (Include Timeframe) advance diet as soon as possible   Mutuality/Individual Preferences   What Information Would Help Us Give You More Personalized Care? patel kline       Problem: Infection, Risk/Actual (Adult)  Goal: Identify Related Risk Factors and Signs and Symptoms  Outcome: Ongoing (interventions implemented as appropriate)   08/21/19 1742   Infection, Risk/Actual (Adult)   Related Risk Factors (Infection, Risk/Actual) surgery/procedure     Goal: Infection Prevention/Resolution  Outcome: Ongoing (interventions implemented as appropriate)   08/21/19 1742   Infection, Risk/Actual (Adult)   Infection Prevention/Resolution making progress toward outcome

## 2019-08-21 NOTE — OP NOTE
Surgeon: Bon Kong Jr., M.D.    Assistant: Zohreh Lemus CFA    Pre-Operative Diagnosis:     Infected hernioplasty mesh, initial encounter (CMS/[facility]) [T85.79XA]    Post-Operative Diagnosis:    Post-Op Diagnosis Codes:     * Infected hernioplasty mesh, initial encounter (CMS/[facility]) [T85.79XA]    Procedure Performed:     Exploratory laparotomy with removal of infected mesh    Indications:     The patient is a pleasant 74-year-old male who had a ventral hernia repair with mesh more than 15 years ago and has developed a draining sinus suspicious for a mesh infection.  CT scan of the abdomen pelvis shows a complicated fluid collection in a subfascial position.  He presents for exploratory laparotomy with removal of infected mesh.  The patient understands the indications, alternatives, risks, and benefits of the procedure and wishes to proceed.    Procedure:     The patient was identified and taken to the operating room where he was placed in the supine position on the operating table.  He underwent general endotracheal anesthesia and was appropriate monitored throughout the case by the anesthesia personnel.  His abdomen was prepped and draped in standard surgical fashion.  Previous midline incision was opened with a scalpel carried through the skin into the subcutaneous tissue.  In the midportion of this incision there is a draining sinus which was excised in elliptical fashion.  The subcutaneous tissue was divided using bolus cautery down to the fascia and there was significant scarring to this entire area.  The fascia was opened through the scarring in an area where drainage was present.  Opening this allowed access into a subfascial collection that clearly contained infected mesh and fluid.  Cultures were taken.  The fascia was opened above this collection using both electrocautery to allow complete access to the mesh.  The mesh was removed by applying traction and dividing Prolene sutures that were tacking the mesh.   Once this was removed there was a second piece of mesh identified deeper in an intra-abdominal position.  This mesh was also infected and removed in the same fashion.  There is no evidence of bowel attached to the mesh.  Once removed, there was a peel present over the omentum and this was left in place.  The area was copiously irrigated with bacitracin irrigation fluid.  A 19 round Boogie drain was placed in the subfascial position in the midst of the peel and area of the collection superficial to the omentum.  The VALARIE drain was brought out through a right sided stab wound and secured at the skin with a 2-0 nylon suture.  The fascia was then reapproximated with 0 PDS sutures in an interrupted figure-of-eight fashion.  The area was infiltrated with 0.5% Marcaine with epinephrine and Exparel.  The subcutaneous tissue was reapproximated with 3-0 Vicryl suture in a running fashion.  The skin was closed with skin staples.  The sponge, needle, management counts were correct at the end of the case.  The patient tolerated procedure well was transferred to the recovery room in stable condition.    Estimated Blood Loss:      15 mL    Specimens:       Order Name Source Comment Collection Info Order Time   HARDWARE / FOREIGN BODY CULTURE Abdominal Wall  Collected By: Bon Kong Jr., MD 8/21/2019  9:17 AM   TISSUE PATHOLOGY EXAM Abdominal Wall  Collected By: Bon Kong Jr., MD 8/21/2019  8:43 AM       Bon Kong Jr., M.D.

## 2019-08-21 NOTE — ANESTHESIA PROCEDURE NOTES
Airway  Urgency: elective    Airway not difficult    General Information and Staff    Patient location during procedure: OR  Anesthesiologist: Dwaine Thapa MD  CRNA: Jaren Butler CRNA    Indications and Patient Condition  Indications for airway management: airway protection    Preoxygenated: yes  MILS maintained throughout  Mask difficulty assessment: 1 - vent by mask    Final Airway Details  Final airway type: endotracheal airway      Successful airway: ETT  Cuffed: yes   Successful intubation technique: direct laryngoscopy  Facilitating devices/methods: intubating stylet  Endotracheal tube insertion site: oral  Blade: Nicole  Blade size: 4  ETT size (mm): 7.5  Cormack-Lehane Classification: grade IIa - partial view of glottis  Placement verified by: chest auscultation and capnometry   Measured from: lips  ETT to lips (cm): 20  Number of attempts at approach: 1    Additional Comments  Teeth checked, no damage. Atraumatic.

## 2019-08-21 NOTE — ANESTHESIA POSTPROCEDURE EVALUATION
Patient: Reggie Coy    Procedure Summary     Date:  08/21/19 Room / Location:  [facility] OR 02 / [facility] MAIN OR    Anesthesia Start:  0804 Anesthesia Stop:  0949    Procedure:  exploratory laparotomy with mesh removal (N/A Abdomen) Diagnosis:       Infected hernioplasty mesh, initial encounter (CMS/[facility])      (Infected hernioplasty mesh, initial encounter (CMS/[facility]) [T85.79XA])    Surgeon:  Bon Kong Jr., MD Provider:  Dwaine Thapa MD    Anesthesia Type:  general ASA Status:  3          Anesthesia Type: general  Last vitals  BP   159/81 (08/21/19 0955)   Temp   36.8 °C (98.2 °F) (08/21/19 0948)   Pulse   93 (08/21/19 0955)   Resp   16 (08/21/19 0955)     SpO2   97 % (08/21/19 0955)     Post Anesthesia Care and Evaluation    Patient location during evaluation: PACU  Patient participation: complete - patient participated  Level of consciousness: awake and alert  Pain management: adequate  Airway patency: patent  Anesthetic complications: No anesthetic complications    Cardiovascular status: acceptable  Respiratory status: acceptable  Hydration status: acceptable    Comments: --------------------            08/21/19 0955     --------------------   BP:       159/81     Pulse:      93       Resp:       16       Temp:                SpO2:      97%      --------------------

## 2019-08-21 NOTE — ANESTHESIA PREPROCEDURE EVALUATION
Anesthesia Evaluation     Patient summary reviewed and Nursing notes reviewed                Airway   Mallampati: II  Dental    (+) partials    Pulmonary    (+) a smoker Former,   Cardiovascular     ECG reviewed  Rhythm: regular  Rate: normal    (+) hypertension, hyperlipidemia,       Neuro/Psych- negative ROS  GI/Hepatic/Renal/Endo - negative ROS     Musculoskeletal (-) negative ROS    Abdominal    Substance History - negative use     OB/GYN negative ob/gyn ROS         Other                        Anesthesia Plan    ASA 3     general     intravenous induction   Anesthetic plan, all risks, benefits, and alternatives have been provided, discussed and informed consent has been obtained with: patient.

## 2019-08-22 VITALS
WEIGHT: 170 LBS | SYSTOLIC BLOOD PRESSURE: 134 MMHG | OXYGEN SATURATION: 98 % | HEART RATE: 81 BPM | RESPIRATION RATE: 18 BRPM | TEMPERATURE: 97 F | DIASTOLIC BLOOD PRESSURE: 72 MMHG | HEIGHT: 63 IN | BODY MASS INDEX: 30.12 KG/M2

## 2019-08-22 LAB
ANION GAP SERPL CALCULATED.3IONS-SCNC: 11.2 MMOL/L (ref 5–15)
BASOPHILS # BLD AUTO: 0.01 10*3/MM3 (ref 0–0.2)
BASOPHILS NFR BLD AUTO: 0.1 % (ref 0–1.5)
BUN BLD-MCNC: 9 MG/DL (ref 8–23)
BUN/CREAT SERPL: 9.5 (ref 7–25)
CALCIUM SPEC-SCNC: 8.4 MG/DL (ref 8.6–10.5)
CHLORIDE SERPL-SCNC: 100 MMOL/L (ref 98–107)
CO2 SERPL-SCNC: 23.8 MMOL/L (ref 22–29)
CREAT BLD-MCNC: 0.95 MG/DL (ref 0.76–1.27)
DEPRECATED RDW RBC AUTO: 50.7 FL (ref 37–54)
EOSINOPHIL # BLD AUTO: 0.04 10*3/MM3 (ref 0–0.4)
EOSINOPHIL NFR BLD AUTO: 0.4 % (ref 0.3–6.2)
ERYTHROCYTE [DISTWIDTH] IN BLOOD BY AUTOMATED COUNT: 14.6 % (ref 12.3–15.4)
GFR SERPL CREATININE-BSD FRML MDRD: 94 ML/MIN/1.73
GLUCOSE BLD-MCNC: 155 MG/DL (ref 65–99)
HCT VFR BLD AUTO: 37.4 % (ref 37.5–51)
HGB BLD-MCNC: 12.2 G/DL (ref 13–17.7)
IMM GRANULOCYTES # BLD AUTO: 0.04 10*3/MM3 (ref 0–0.05)
IMM GRANULOCYTES NFR BLD AUTO: 0.4 % (ref 0–0.5)
LYMPHOCYTES # BLD AUTO: 2.68 10*3/MM3 (ref 0.7–3.1)
LYMPHOCYTES NFR BLD AUTO: 27.8 % (ref 19.6–45.3)
MCH RBC QN AUTO: 31 PG (ref 26.6–33)
MCHC RBC AUTO-ENTMCNC: 32.6 G/DL (ref 31.5–35.7)
MCV RBC AUTO: 94.9 FL (ref 79–97)
MONOCYTES # BLD AUTO: 0.56 10*3/MM3 (ref 0.1–0.9)
MONOCYTES NFR BLD AUTO: 5.8 % (ref 5–12)
NEUTROPHILS # BLD AUTO: 6.31 10*3/MM3 (ref 1.7–7)
NEUTROPHILS NFR BLD AUTO: 65.5 % (ref 42.7–76)
NRBC BLD AUTO-RTO: 0.1 /100 WBC (ref 0–0.2)
PLATELET # BLD AUTO: 161 10*3/MM3 (ref 140–450)
PMV BLD AUTO: 10.8 FL (ref 6–12)
POTASSIUM BLD-SCNC: 4 MMOL/L (ref 3.5–5.2)
RBC # BLD AUTO: 3.94 10*6/MM3 (ref 4.14–5.8)
SODIUM BLD-SCNC: 135 MMOL/L (ref 136–145)
WBC NRBC COR # BLD: 9.64 10*3/MM3 (ref 3.4–10.8)

## 2019-08-22 PROCEDURE — 80048 BASIC METABOLIC PNL TOTAL CA: CPT | Performed by: SURGERY

## 2019-08-22 PROCEDURE — 99024 POSTOP FOLLOW-UP VISIT: CPT | Performed by: SURGERY

## 2019-08-22 PROCEDURE — 25010000002 ENOXAPARIN PER 10 MG: Performed by: SURGERY

## 2019-08-22 PROCEDURE — 85025 COMPLETE CBC W/AUTO DIFF WBC: CPT | Performed by: SURGERY

## 2019-08-22 RX ORDER — OXYCODONE HYDROCHLORIDE AND ACETAMINOPHEN 5; 325 MG/1; MG/1
1 TABLET ORAL EVERY 4 HOURS PRN
Qty: 30 TABLET | Refills: 0 | Status: SHIPPED | OUTPATIENT
Start: 2019-08-22 | End: 2019-09-03

## 2019-08-22 RX ADMIN — ENOXAPARIN SODIUM 40 MG: 40 INJECTION SUBCUTANEOUS at 09:14

## 2019-08-22 RX ADMIN — LISINOPRIL 20 MG: 20 TABLET ORAL at 09:14

## 2019-08-22 RX ADMIN — AMLODIPINE BESYLATE 5 MG: 5 TABLET ORAL at 09:13

## 2019-08-22 NOTE — PROGRESS NOTES
Case Management Discharge Note    Final Note: Home--no needs. RN to instruct regarding drain care. Family to transport per private auto. No additional needs noted.    Destination      No service has been selected for the patient.      Durable Medical Equipment      No service has been selected for the patient.      Dialysis/Infusion      No service has been selected for the patient.      Home Medical Care      No service has been selected for the patient.      Therapy      No service has been selected for the patient.      Community Resources      No service has been selected for the patient.             Final Discharge Disposition Code: 01 - home or self-care

## 2019-08-22 NOTE — DISCHARGE SUMMARY
Discharge Summary    Date of admission: 8/21/2019    Date of discharge: 8/22/2019    Final diagnosis:    1.  Intra-abdominal infected mesh    Procedures performed during admission:    1.  Exploratory laparotomy with removal of infected mesh on 8/21/2019    Consulting physicians:    1.  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  CT scan of the abdomen and pelvis shows a complex subfascial fluid collection consistent with infected mass.  He presents for exploratory laparotomy with mesh removal.    Hospital course:    The patient was admitted on 8/21/2019 and underwent an exploratory laparotomy with removal of infected mesh.  A VALARIE drain was placed at the time of surgery.  He was transferred to the floor postoperatively where he did quite well.  His pain control was adequate.  He was able to tolerate a diet and had bowel function.  He is ready for discharge to home.    Prescriptions:    He was given a prescription for Percocet.    Follow-up:    He will follow-up with me in 1 week.    Bon Kong Jr., M.D.

## 2019-08-22 NOTE — PLAN OF CARE
Problem: Patient Care Overview  Goal: Plan of Care Review  Outcome: Ongoing (interventions implemented as appropriate)   08/22/19 2130   Coping/Psychosocial   Plan of Care Reviewed With patient   Plan of Care Review   Progress no change   OTHER   Outcome Summary Doron hogue DC this AM. Passing flatus, mild mid abd pain. VALARIE drain OP serosanginous. Midline dressing clean dry intact. Tolerating reg diet. Continuing to monitor.

## 2019-08-22 NOTE — PROGRESS NOTES
Discharge Planning Assessment  [facility]     Patient Name: Reggie Coy  MRN: [id number]  Today's Date: 8/22/2019    Admit Date: 8/21/2019    Discharge Needs Assessment     Row Name 08/22/19 1345       Living Environment    Lives With  spouse    Name(s) of Who Lives With Patient  wife Alisha Coy [phone]     Current Living Arrangements  home/apartment/condo    Primary Care Provided by  self;spouse/significant other    Provides Primary Care For  no one    Family Caregiver if Needed  spouse    Family Caregiver Names  wife Alisha Coy [phone]     Quality of Family Relationships  helpful;involved;supportive    Able to Return to Prior Arrangements  yes       Resource/Environmental Concerns    Resource/Environmental Concerns  none    Transportation Concerns  car, none       Transition Planning    Patient/Family Anticipates Transition to  home with family    Patient/Family Anticipated Services at Transition  none    Transportation Anticipated  family or friend will provide       Discharge Needs Assessment    Concerns to be Addressed  denies needs/concerns at this time    Equipment Currently Used at Home  none    Anticipated Changes Related to Illness  none    Equipment Needed After Discharge  none    Offered/Gave Vendor List  yes    Current Discharge Risk  physical impairment        Discharge Plan     Row Name 08/22/19 5506       Plan    Plan  Home; denies needs.  Follow to see if Lovenox will be needed upon d/c.     Patient/Family in Agreement with Plan  yes    Plan Comments  Met with the patient and his wife Alisha Coy [phone] at bedside; explained role of CCP, verified facesheet, checked IMM and discussed discharge planning needs.  The patient states that he plans to return home where he resides with his spouse and his adult grand-daughter who can assist him at home as needed.  The patient uses no DME, has no steps to enter his 2 story home but has everything that he needs on the main level of the  home.  The patient's PCP is Bon Abebe, [facility] is [facility] [address] and he denies any trouble remembering to take his medication or with affording his medication.  The patient denies any HH/SNF history, was provided with a HH/SNF list and the patient denies any POA documents.  The patient states that he drives himself to appointments and his wife states that she will transport the patient home upon d/c.  The patient currently denies any d/c needs and CCP will follow to see if the patient is d/c on Lovenox.  AR Bagley        Destination      No service coordination in this encounter.      Durable Medical Equipment      No service coordination in this encounter.      Dialysis/Infusion      No service coordination in this encounter.      Home Medical Care      No service coordination in this encounter.      Therapy      No service coordination in this encounter.      Community Resources      No service coordination in this encounter.          Demographic Summary     Row Name 08/22/19 1455       General Information    Admission Type  inpatient    Arrived From  home    Referral Source  admission list    Reason for Consult  discharge planning    Preferred Language  English     Used During This Interaction  no        Functional Status     Row Name 08/22/19 7263       Functional Status    Usual Activity Tolerance  good    Current Activity Tolerance  moderate       Functional Status, IADL    Medications  independent    Meal Preparation  independent    Housekeeping  independent    Laundry  independent    Shopping  independent       Mental Status    General Appearance WDL  WDL       Mental Status Summary    Recent Changes in Mental Status/Cognitive Functioning  no changes        Psychosocial    No documentation.       Abuse/Neglect    No documentation.       Legal    No documentation.       Substance Abuse    No documentation.       Patient Forms     Row Name 08/22/19 6867       Patient Forms     Provider Choice List  Delivered    Delivered to  Patient    Method of delivery  In person            AR Bautista

## 2019-08-22 NOTE — PROGRESS NOTES
Chief Complaint:    S/P Exploratory laparotomy with removal of infected mesh, POD 1    Subjective:    The patient is feeling well with minimal abdominal pain.  He is not having any nausea or vomiting.  He had a bowel movement yesterday.    Objective:    Temp:  [97.3 °F (36.3 °C)-98.9 °F (37.2 °C)] 98 °F (36.7 °C)  Heart Rate:  [] 73  Resp:  [16-18] 18  BP: ()/(56-88) 91/56    Physical Exam   Constitutional: He is cooperative. He does not appear ill. No distress.   Pulmonary/Chest: Effort normal. No respiratory distress.   Abdominal: Soft. There is generalized tenderness (Appropriate postop tenderness).   VALARIE drain: Serosanguineous output.   Neurological: He is alert.       Results:    Labs are basically normal.    Impression/Plan:    The patient is POD 1 from an exploratory laparotomy with removal of infected mesh.  He is recovering well and should be ready for discharge to home later today or tomorrow morning.    Bon Kong Jr., M.D.

## 2019-08-22 NOTE — PLAN OF CARE
Problem: Patient Care Overview  Goal: Plan of Care Review  Outcome: Ongoing (interventions implemented as appropriate)   08/22/19 7324   Coping/Psychosocial   Plan of Care Reviewed With patient;family   Plan of Care Review   Progress no change   OTHER   Outcome Summary Pt d/c home today      Goal: Individualization and Mutuality  Outcome: Ongoing (interventions implemented as appropriate)    Goal: Discharge Needs Assessment  Outcome: Ongoing (interventions implemented as appropriate)    Goal: Interprofessional Rounds/Family Conf  Outcome: Ongoing (interventions implemented as appropriate)      Problem: Infection, Risk/Actual (Adult)  Goal: Identify Related Risk Factors and Signs and Symptoms  Outcome: Outcome(s) achieved Date Met: 08/22/19    Goal: Infection Prevention/Resolution  Outcome: Ongoing (interventions implemented as appropriate)

## 2019-08-23 LAB — BACTERIA SPEC AEROBE CULT: ABNORMAL

## 2019-08-24 LAB
BACTERIA SPEC AEROBE CULT: NO GROWTH
GRAM STN SPEC: NORMAL
GRAM STN SPEC: NORMAL

## 2019-08-26 NOTE — PROGRESS NOTES
Case Management Discharge Note    Final Note: [facility] , [facility]      No service has been selected for the patient.      Durable Medical Equipment      No service has been selected for the patient.      Dialysis/Infusion      No service has been selected for the patient.      Home Medical Care      No service has been selected for the patient.      Therapy      No service has been selected for the patient.      Community Resources      No service has been selected for the patient.        Transportation Services  Private: Car    Final Discharge Disposition Code: 01 - home or self-care

## 2019-08-28 ENCOUNTER — TELEPHONE (OUTPATIENT)
Dept: SURGERY | Facility: CLINIC | Age: 74
End: 2019-08-28

## 2019-08-28 RX ORDER — SULFAMETHOXAZOLE AND TRIMETHOPRIM 800; 160 MG/1; MG/1
1 TABLET ORAL 2 TIMES DAILY
Qty: 20 TABLET | Refills: 0 | Status: SHIPPED | OUTPATIENT
Start: 2019-08-28 | End: 2019-10-03 | Stop reason: HOSPADM

## 2019-08-28 NOTE — TELEPHONE ENCOUNTER
Please let them know I sent a prescription to the pharmacy.  They should follow-up as scheduled.  Thanks

## 2019-08-28 NOTE — TELEPHONE ENCOUNTER
Pt's wife called stating pt's drain is putting out brown cloudy fluid s/p exploratory laparotomy with removal of infected mesh on 8/21/19. The output was serous fluid up until today. They were not able to determine if the fluid has an odor. Pt is not having any pain just slight discomfort. Pt has follow-up scheduled on 9/3. Do you want to see pt sooner or is this date ok? She also reported pt's drain totals:    8/24 92 cc  8/25 45 cc  8/26 27 cc  8/27 65 cc  8/28 35 cc so far this morning

## 2019-09-03 ENCOUNTER — OFFICE VISIT (OUTPATIENT)
Dept: SURGERY | Facility: CLINIC | Age: 74
End: 2019-09-03

## 2019-09-03 VITALS — WEIGHT: 168.8 LBS | OXYGEN SATURATION: 98 % | HEART RATE: 83 BPM | BODY MASS INDEX: 29.9 KG/M2

## 2019-09-03 DIAGNOSIS — Z48.89 POSTOPERATIVE VISIT: Primary | ICD-10-CM

## 2019-09-03 PROCEDURE — 99024 POSTOP FOLLOW-UP VISIT: CPT | Performed by: SURGERY

## 2019-09-03 NOTE — PROGRESS NOTES
Subjective   Reggie Coy is a 74 y.o. male who returns to the office after undergoing an exploratory laparotomy with removal of infected mesh on 8/21/2019.     History of Present Illness     The patient is recovering well with no significant postop symptoms.  He is having no abdominal pain.  He has a good appetite and normal bowel function.  His energy level is good.  The VALARIE drain has minimal output.    Review of Systems   Constitutional: Negative for activity change, appetite change, fatigue and fever.   Respiratory: Negative for chest tightness and shortness of breath.    Cardiovascular: Negative for chest pain and palpitations.   Gastrointestinal: Negative for abdominal pain, constipation, diarrhea and nausea.   Skin: Negative for rash and wound.   Psychiatric/Behavioral: Negative for agitation and confusion.       Objective   Physical Exam   Constitutional:  Non-toxic appearance. He does not appear ill. No distress.   Pulmonary/Chest: Effort normal. No respiratory distress.   Abdominal: Soft. Normal appearance. There is no tenderness.   Neurological: He is alert.   Skin:   Incision: intact with no evidence of infection.   Psychiatric: He has a normal mood and affect. His behavior is normal.       Assessment/Plan   The encounter diagnosis was Postoperative visit.    The patient is recovering well from his exploratory laparotomy with removal of infected mesh.  His skin staples and VALARIE drain was removed.  He was advised to complete his course of Bactrim.  He was also advised to avoid heavy lifting for another 5 weeks.  He will follow-up on an as-needed basis.

## 2019-10-02 ENCOUNTER — HOSPITAL ENCOUNTER (OUTPATIENT)
Facility: HOSPITAL | Age: 74
Setting detail: OBSERVATION
Discharge: HOME | End: 2019-10-03
Attending: EMERGENCY MEDICINE | Admitting: SURGERY

## 2019-10-02 ENCOUNTER — APPOINTMENT (OUTPATIENT)
Dept: CT IMAGING | Facility: HOSPITAL | Age: 74
End: 2019-10-02

## 2019-10-02 ENCOUNTER — ANESTHESIA EVENT (OUTPATIENT)
Dept: PERIOP | Facility: HOSPITAL | Age: 74
End: 2019-10-02

## 2019-10-02 ENCOUNTER — ANESTHESIA (OUTPATIENT)
Dept: PERIOP | Facility: HOSPITAL | Age: 74
End: 2019-10-02

## 2019-10-02 DIAGNOSIS — T85.79XA: ICD-10-CM

## 2019-10-02 DIAGNOSIS — T81.49XA ABDOMINAL WALL ABSCESS AT SITE OF SURGICAL WOUND: Primary | ICD-10-CM

## 2019-10-02 PROBLEM — I10 ESSENTIAL HYPERTENSION: Status: ACTIVE | Noted: 2019-10-02

## 2019-10-02 PROBLEM — Z12.11 ENCOUNTER FOR SCREENING COLONOSCOPY: Status: RESOLVED | Noted: 2019-07-30 | Resolved: 2019-10-02

## 2019-10-02 PROBLEM — Z86.010 HISTORY OF COLON POLYPS: Status: RESOLVED | Noted: 2019-08-08 | Resolved: 2019-10-02

## 2019-10-02 LAB
ALBUMIN SERPL-MCNC: 4.1 G/DL (ref 3.5–5.2)
ALBUMIN/GLOB SERPL: 1.1 G/DL
ALP SERPL-CCNC: 81 U/L (ref 39–117)
ALT SERPL W P-5'-P-CCNC: 35 U/L (ref 1–41)
ANION GAP SERPL CALCULATED.3IONS-SCNC: 14 MMOL/L (ref 5–15)
AST SERPL-CCNC: 55 U/L (ref 1–40)
BASOPHILS # BLD AUTO: 0.03 10*3/MM3 (ref 0–0.2)
BASOPHILS NFR BLD AUTO: 0.4 % (ref 0–1.5)
BILIRUB SERPL-MCNC: 0.3 MG/DL (ref 0.2–1.2)
BUN BLD-MCNC: 5 MG/DL (ref 8–23)
BUN/CREAT SERPL: 5.9 (ref 7–25)
CALCIUM SPEC-SCNC: 8.9 MG/DL (ref 8.6–10.5)
CHLORIDE SERPL-SCNC: 104 MMOL/L (ref 98–107)
CO2 SERPL-SCNC: 25 MMOL/L (ref 22–29)
CREAT BLD-MCNC: 0.85 MG/DL (ref 0.76–1.27)
DEPRECATED RDW RBC AUTO: 50.3 FL (ref 37–54)
EOSINOPHIL # BLD AUTO: 0.07 10*3/MM3 (ref 0–0.4)
EOSINOPHIL NFR BLD AUTO: 1 % (ref 0.3–6.2)
ERYTHROCYTE [DISTWIDTH] IN BLOOD BY AUTOMATED COUNT: 14.6 % (ref 12.3–15.4)
GFR SERPL CREATININE-BSD FRML MDRD: 107 ML/MIN/1.73
GLOBULIN UR ELPH-MCNC: 3.9 GM/DL
GLUCOSE BLD-MCNC: 98 MG/DL (ref 65–99)
HCT VFR BLD AUTO: 40.3 % (ref 37.5–51)
HGB BLD-MCNC: 13.4 G/DL (ref 13–17.7)
IMM GRANULOCYTES # BLD AUTO: 0.02 10*3/MM3 (ref 0–0.05)
IMM GRANULOCYTES NFR BLD AUTO: 0.3 % (ref 0–0.5)
INR PPP: 1.06 (ref 0.9–1.1)
LYMPHOCYTES # BLD AUTO: 3.55 10*3/MM3 (ref 0.7–3.1)
LYMPHOCYTES NFR BLD AUTO: 51.5 % (ref 19.6–45.3)
MCH RBC QN AUTO: 31 PG (ref 26.6–33)
MCHC RBC AUTO-ENTMCNC: 33.3 G/DL (ref 31.5–35.7)
MCV RBC AUTO: 93.3 FL (ref 79–97)
MONOCYTES # BLD AUTO: 0.64 10*3/MM3 (ref 0.1–0.9)
MONOCYTES NFR BLD AUTO: 9.3 % (ref 5–12)
NEUTROPHILS # BLD AUTO: 2.58 10*3/MM3 (ref 1.7–7)
NEUTROPHILS NFR BLD AUTO: 37.5 % (ref 42.7–76)
NRBC BLD AUTO-RTO: 0 /100 WBC (ref 0–0.2)
PLATELET # BLD AUTO: 200 10*3/MM3 (ref 140–450)
PMV BLD AUTO: 9.8 FL (ref 6–12)
POTASSIUM BLD-SCNC: 3.9 MMOL/L (ref 3.5–5.2)
PROT SERPL-MCNC: 8 G/DL (ref 6–8.5)
PROTHROMBIN TIME: 13.5 SECONDS (ref 11.7–14.2)
RBC # BLD AUTO: 4.32 10*6/MM3 (ref 4.14–5.8)
SODIUM BLD-SCNC: 143 MMOL/L (ref 136–145)
WBC NRBC COR # BLD: 6.89 10*3/MM3 (ref 3.4–10.8)

## 2019-10-02 PROCEDURE — 11008 RMV PRSTC MTRL/MESH ABD WALL: CPT | Performed by: SURGERY

## 2019-10-02 PROCEDURE — 10180 I&D COMPLEX PO WOUND INFCTJ: CPT | Performed by: SURGERY

## 2019-10-02 PROCEDURE — 25010000002 PROPOFOL 10 MG/ML EMULSION: Performed by: REGISTERED NURSE

## 2019-10-02 PROCEDURE — G0378 HOSPITAL OBSERVATION PER HR: HCPCS

## 2019-10-02 PROCEDURE — 25010000002 HYDROMORPHONE PER 4 MG: Performed by: REGISTERED NURSE

## 2019-10-02 PROCEDURE — 96374 THER/PROPH/DIAG INJ IV PUSH: CPT

## 2019-10-02 PROCEDURE — 25010000002 MIDAZOLAM PER 1 MG: Performed by: ANESTHESIOLOGY

## 2019-10-02 PROCEDURE — 25010000002 IOPAMIDOL 61 % SOLUTION: Performed by: EMERGENCY MEDICINE

## 2019-10-02 PROCEDURE — 87205 SMEAR GRAM STAIN: CPT | Performed by: SURGERY

## 2019-10-02 PROCEDURE — 85025 COMPLETE CBC W/AUTO DIFF WBC: CPT | Performed by: EMERGENCY MEDICINE

## 2019-10-02 PROCEDURE — 25010000002 FENTANYL CITRATE (PF) 100 MCG/2ML SOLUTION: Performed by: REGISTERED NURSE

## 2019-10-02 PROCEDURE — 87070 CULTURE OTHR SPECIMN AEROBIC: CPT | Performed by: SURGERY

## 2019-10-02 PROCEDURE — 85610 PROTHROMBIN TIME: CPT | Performed by: EMERGENCY MEDICINE

## 2019-10-02 PROCEDURE — 87147 CULTURE TYPE IMMUNOLOGIC: CPT | Performed by: SURGERY

## 2019-10-02 PROCEDURE — 25010000002 LORAZEPAM PER 2 MG: Performed by: EMERGENCY MEDICINE

## 2019-10-02 PROCEDURE — 80053 COMPREHEN METABOLIC PANEL: CPT | Performed by: EMERGENCY MEDICINE

## 2019-10-02 PROCEDURE — 25010000002 ONDANSETRON PER 1 MG: Performed by: ANESTHESIOLOGY

## 2019-10-02 PROCEDURE — 99024 POSTOP FOLLOW-UP VISIT: CPT | Performed by: SURGERY

## 2019-10-02 PROCEDURE — 25010000003 CEFAZOLIN IN DEXTROSE 2-4 GM/100ML-% SOLUTION: Performed by: SURGERY

## 2019-10-02 PROCEDURE — 88300 SURGICAL PATH GROSS: CPT | Performed by: SURGERY

## 2019-10-02 PROCEDURE — 74177 CT ABD & PELVIS W/CONTRAST: CPT

## 2019-10-02 PROCEDURE — 99284 EMERGENCY DEPT VISIT MOD MDM: CPT

## 2019-10-02 PROCEDURE — 25010000002 ONDANSETRON PER 1 MG: Performed by: SURGERY

## 2019-10-02 PROCEDURE — 87186 SC STD MICRODIL/AGAR DIL: CPT | Performed by: SURGERY

## 2019-10-02 PROCEDURE — 25010000002 DEXAMETHASONE PER 1 MG: Performed by: REGISTERED NURSE

## 2019-10-02 RX ORDER — SODIUM CHLORIDE 0.9 % (FLUSH) 0.9 %
3 SYRINGE (ML) INJECTION EVERY 12 HOURS SCHEDULED
Status: DISCONTINUED | OUTPATIENT
Start: 2019-10-02 | End: 2019-10-02 | Stop reason: HOSPADM

## 2019-10-02 RX ORDER — LORAZEPAM 2 MG/ML
1 INJECTION INTRAMUSCULAR ONCE
Status: COMPLETED | OUTPATIENT
Start: 2019-10-02 | End: 2019-10-02

## 2019-10-02 RX ORDER — ONDANSETRON 2 MG/ML
4 INJECTION INTRAMUSCULAR; INTRAVENOUS EVERY 6 HOURS PRN
Status: DISCONTINUED | OUTPATIENT
Start: 2019-10-02 | End: 2019-10-03 | Stop reason: HOSPADM

## 2019-10-02 RX ORDER — PROPOFOL 10 MG/ML
VIAL (ML) INTRAVENOUS AS NEEDED
Status: DISCONTINUED | OUTPATIENT
Start: 2019-10-02 | End: 2019-10-02 | Stop reason: SURG

## 2019-10-02 RX ORDER — SODIUM CHLORIDE 0.9 % (FLUSH) 0.9 %
10 SYRINGE (ML) INJECTION EVERY 12 HOURS SCHEDULED
Status: DISCONTINUED | OUTPATIENT
Start: 2019-10-02 | End: 2019-10-03 | Stop reason: HOSPADM

## 2019-10-02 RX ORDER — FAMOTIDINE 10 MG/ML
20 INJECTION, SOLUTION INTRAVENOUS ONCE
Status: COMPLETED | OUTPATIENT
Start: 2019-10-02 | End: 2019-10-02

## 2019-10-02 RX ORDER — LIDOCAINE HYDROCHLORIDE 20 MG/ML
INJECTION, SOLUTION INFILTRATION; PERINEURAL AS NEEDED
Status: DISCONTINUED | OUTPATIENT
Start: 2019-10-02 | End: 2019-10-02 | Stop reason: SURG

## 2019-10-02 RX ORDER — MIDAZOLAM HYDROCHLORIDE 1 MG/ML
1 INJECTION INTRAMUSCULAR; INTRAVENOUS
Status: DISCONTINUED | OUTPATIENT
Start: 2019-10-02 | End: 2019-10-02 | Stop reason: HOSPADM

## 2019-10-02 RX ORDER — NALOXONE HCL 0.4 MG/ML
0.4 VIAL (ML) INJECTION
Status: DISCONTINUED | OUTPATIENT
Start: 2019-10-02 | End: 2019-10-03 | Stop reason: HOSPADM

## 2019-10-02 RX ORDER — DEXTROSE, SODIUM CHLORIDE, AND POTASSIUM CHLORIDE 5; .45; .15 G/100ML; G/100ML; G/100ML
100 INJECTION INTRAVENOUS CONTINUOUS
Status: DISCONTINUED | OUTPATIENT
Start: 2019-10-02 | End: 2019-10-02

## 2019-10-02 RX ORDER — CEFAZOLIN SODIUM 2 G/100ML
2 INJECTION, SOLUTION INTRAVENOUS EVERY 8 HOURS
Status: DISCONTINUED | OUTPATIENT
Start: 2019-10-02 | End: 2019-10-03 | Stop reason: HOSPADM

## 2019-10-02 RX ORDER — ACETAMINOPHEN 325 MG/1
650 TABLET ORAL ONCE AS NEEDED
Status: DISCONTINUED | OUTPATIENT
Start: 2019-10-02 | End: 2019-10-02 | Stop reason: HOSPADM

## 2019-10-02 RX ORDER — MORPHINE SULFATE 2 MG/ML
2 INJECTION, SOLUTION INTRAMUSCULAR; INTRAVENOUS EVERY 4 HOURS PRN
Status: DISCONTINUED | OUTPATIENT
Start: 2019-10-02 | End: 2019-10-03 | Stop reason: HOSPADM

## 2019-10-02 RX ORDER — PROMETHAZINE HYDROCHLORIDE 25 MG/ML
12.5 INJECTION, SOLUTION INTRAMUSCULAR; INTRAVENOUS ONCE AS NEEDED
Status: DISCONTINUED | OUTPATIENT
Start: 2019-10-02 | End: 2019-10-02 | Stop reason: HOSPADM

## 2019-10-02 RX ORDER — NALOXONE HCL 0.4 MG/ML
0.2 VIAL (ML) INJECTION AS NEEDED
Status: DISCONTINUED | OUTPATIENT
Start: 2019-10-02 | End: 2019-10-02 | Stop reason: HOSPADM

## 2019-10-02 RX ORDER — SODIUM CHLORIDE 0.9 % (FLUSH) 0.9 %
3-10 SYRINGE (ML) INJECTION AS NEEDED
Status: DISCONTINUED | OUTPATIENT
Start: 2019-10-02 | End: 2019-10-02 | Stop reason: HOSPADM

## 2019-10-02 RX ORDER — FENTANYL CITRATE 50 UG/ML
INJECTION, SOLUTION INTRAMUSCULAR; INTRAVENOUS AS NEEDED
Status: DISCONTINUED | OUTPATIENT
Start: 2019-10-02 | End: 2019-10-02 | Stop reason: SURG

## 2019-10-02 RX ORDER — DIPHENHYDRAMINE HCL 25 MG
25 CAPSULE ORAL
Status: DISCONTINUED | OUTPATIENT
Start: 2019-10-02 | End: 2019-10-02 | Stop reason: HOSPADM

## 2019-10-02 RX ORDER — DEXAMETHASONE SODIUM PHOSPHATE 10 MG/ML
INJECTION INTRAMUSCULAR; INTRAVENOUS AS NEEDED
Status: DISCONTINUED | OUTPATIENT
Start: 2019-10-02 | End: 2019-10-02 | Stop reason: SURG

## 2019-10-02 RX ORDER — FENTANYL CITRATE 50 UG/ML
50 INJECTION, SOLUTION INTRAMUSCULAR; INTRAVENOUS
Status: DISCONTINUED | OUTPATIENT
Start: 2019-10-02 | End: 2019-10-02 | Stop reason: HOSPADM

## 2019-10-02 RX ORDER — HYDROCODONE BITARTRATE AND ACETAMINOPHEN 7.5; 325 MG/1; MG/1
1 TABLET ORAL ONCE AS NEEDED
Status: DISCONTINUED | OUTPATIENT
Start: 2019-10-02 | End: 2019-10-02 | Stop reason: HOSPADM

## 2019-10-02 RX ORDER — ONDANSETRON 2 MG/ML
4 INJECTION INTRAMUSCULAR; INTRAVENOUS ONCE AS NEEDED
Status: DISCONTINUED | OUTPATIENT
Start: 2019-10-02 | End: 2019-10-02 | Stop reason: HOSPADM

## 2019-10-02 RX ORDER — LIDOCAINE HYDROCHLORIDE 10 MG/ML
0.5 INJECTION, SOLUTION EPIDURAL; INFILTRATION; INTRACAUDAL; PERINEURAL ONCE AS NEEDED
Status: DISCONTINUED | OUTPATIENT
Start: 2019-10-02 | End: 2019-10-02 | Stop reason: HOSPADM

## 2019-10-02 RX ORDER — ONDANSETRON 2 MG/ML
INJECTION INTRAMUSCULAR; INTRAVENOUS AS NEEDED
Status: DISCONTINUED | OUTPATIENT
Start: 2019-10-02 | End: 2019-10-02 | Stop reason: SURG

## 2019-10-02 RX ORDER — AMLODIPINE BESYLATE 5 MG/1
5 TABLET ORAL DAILY
Status: DISCONTINUED | OUTPATIENT
Start: 2019-10-02 | End: 2019-10-03 | Stop reason: HOSPADM

## 2019-10-02 RX ORDER — PROMETHAZINE HYDROCHLORIDE 25 MG/1
25 SUPPOSITORY RECTAL ONCE AS NEEDED
Status: DISCONTINUED | OUTPATIENT
Start: 2019-10-02 | End: 2019-10-02 | Stop reason: HOSPADM

## 2019-10-02 RX ORDER — HYDROMORPHONE HYDROCHLORIDE 1 MG/ML
0.5 INJECTION, SOLUTION INTRAMUSCULAR; INTRAVENOUS; SUBCUTANEOUS
Status: DISCONTINUED | OUTPATIENT
Start: 2019-10-02 | End: 2019-10-02 | Stop reason: HOSPADM

## 2019-10-02 RX ORDER — SODIUM CHLORIDE 0.9 % (FLUSH) 0.9 %
10 SYRINGE (ML) INJECTION AS NEEDED
Status: DISCONTINUED | OUTPATIENT
Start: 2019-10-02 | End: 2019-10-03 | Stop reason: HOSPADM

## 2019-10-02 RX ORDER — LISINOPRIL 20 MG/1
20 TABLET ORAL
Status: DISCONTINUED | OUTPATIENT
Start: 2019-10-02 | End: 2019-10-03 | Stop reason: HOSPADM

## 2019-10-02 RX ORDER — DIPHENHYDRAMINE HYDROCHLORIDE 50 MG/ML
12.5 INJECTION INTRAMUSCULAR; INTRAVENOUS
Status: DISCONTINUED | OUTPATIENT
Start: 2019-10-02 | End: 2019-10-02 | Stop reason: HOSPADM

## 2019-10-02 RX ORDER — PROMETHAZINE HYDROCHLORIDE 25 MG/1
25 TABLET ORAL ONCE AS NEEDED
Status: DISCONTINUED | OUTPATIENT
Start: 2019-10-02 | End: 2019-10-02 | Stop reason: HOSPADM

## 2019-10-02 RX ORDER — OXYCODONE AND ACETAMINOPHEN 7.5; 325 MG/1; MG/1
1 TABLET ORAL ONCE AS NEEDED
Status: DISCONTINUED | OUTPATIENT
Start: 2019-10-02 | End: 2019-10-02 | Stop reason: HOSPADM

## 2019-10-02 RX ORDER — HYDRALAZINE HYDROCHLORIDE 20 MG/ML
5 INJECTION INTRAMUSCULAR; INTRAVENOUS
Status: DISCONTINUED | OUTPATIENT
Start: 2019-10-02 | End: 2019-10-02 | Stop reason: HOSPADM

## 2019-10-02 RX ORDER — MAGNESIUM HYDROXIDE 1200 MG/15ML
LIQUID ORAL AS NEEDED
Status: DISCONTINUED | OUTPATIENT
Start: 2019-10-02 | End: 2019-10-02 | Stop reason: HOSPADM

## 2019-10-02 RX ORDER — SODIUM CHLORIDE, SODIUM LACTATE, POTASSIUM CHLORIDE, CALCIUM CHLORIDE 600; 310; 30; 20 MG/100ML; MG/100ML; MG/100ML; MG/100ML
9 INJECTION, SOLUTION INTRAVENOUS CONTINUOUS
Status: DISCONTINUED | OUTPATIENT
Start: 2019-10-02 | End: 2019-10-02

## 2019-10-02 RX ORDER — MIDAZOLAM HYDROCHLORIDE 1 MG/ML
2 INJECTION INTRAMUSCULAR; INTRAVENOUS
Status: DISCONTINUED | OUTPATIENT
Start: 2019-10-02 | End: 2019-10-02 | Stop reason: HOSPADM

## 2019-10-02 RX ORDER — LABETALOL HYDROCHLORIDE 5 MG/ML
5 INJECTION, SOLUTION INTRAVENOUS
Status: DISCONTINUED | OUTPATIENT
Start: 2019-10-02 | End: 2019-10-02 | Stop reason: HOSPADM

## 2019-10-02 RX ORDER — BUPIVACAINE HYDROCHLORIDE AND EPINEPHRINE 5; 5 MG/ML; UG/ML
INJECTION, SOLUTION PERINEURAL AS NEEDED
Status: DISCONTINUED | OUTPATIENT
Start: 2019-10-02 | End: 2019-10-02 | Stop reason: HOSPADM

## 2019-10-02 RX ORDER — FLUMAZENIL 0.1 MG/ML
0.2 INJECTION INTRAVENOUS AS NEEDED
Status: DISCONTINUED | OUTPATIENT
Start: 2019-10-02 | End: 2019-10-02 | Stop reason: HOSPADM

## 2019-10-02 RX ORDER — PROMETHAZINE HYDROCHLORIDE 25 MG/ML
6.25 INJECTION, SOLUTION INTRAMUSCULAR; INTRAVENOUS
Status: DISCONTINUED | OUTPATIENT
Start: 2019-10-02 | End: 2019-10-02 | Stop reason: HOSPADM

## 2019-10-02 RX ORDER — OXYCODONE HYDROCHLORIDE AND ACETAMINOPHEN 5; 325 MG/1; MG/1
1 TABLET ORAL EVERY 4 HOURS PRN
Status: DISCONTINUED | OUTPATIENT
Start: 2019-10-02 | End: 2019-10-03 | Stop reason: HOSPADM

## 2019-10-02 RX ORDER — EPHEDRINE SULFATE 50 MG/ML
5 INJECTION, SOLUTION INTRAVENOUS ONCE AS NEEDED
Status: DISCONTINUED | OUTPATIENT
Start: 2019-10-02 | End: 2019-10-02 | Stop reason: HOSPADM

## 2019-10-02 RX ORDER — DEXTROSE, SODIUM CHLORIDE, AND POTASSIUM CHLORIDE 5; .45; .15 G/100ML; G/100ML; G/100ML
50 INJECTION INTRAVENOUS CONTINUOUS
Status: DISCONTINUED | OUTPATIENT
Start: 2019-10-02 | End: 2019-10-03

## 2019-10-02 RX ADMIN — HYDROMORPHONE HYDROCHLORIDE 0.5 MG: 1 INJECTION, SOLUTION INTRAMUSCULAR; INTRAVENOUS; SUBCUTANEOUS at 16:13

## 2019-10-02 RX ADMIN — FENTANYL CITRATE 50 MCG: 50 INJECTION INTRAMUSCULAR; INTRAVENOUS at 16:06

## 2019-10-02 RX ADMIN — OXYCODONE HYDROCHLORIDE AND ACETAMINOPHEN 1 TABLET: 5; 325 TABLET ORAL at 22:55

## 2019-10-02 RX ADMIN — LIDOCAINE HYDROCHLORIDE 60 MG: 20 INJECTION, SOLUTION INFILTRATION; PERINEURAL at 14:51

## 2019-10-02 RX ADMIN — PROPOFOL 180 MG: 10 INJECTION, EMULSION INTRAVENOUS at 14:51

## 2019-10-02 RX ADMIN — FENTANYL CITRATE 50 MCG: 50 INJECTION INTRAMUSCULAR; INTRAVENOUS at 15:36

## 2019-10-02 RX ADMIN — FENTANYL CITRATE 50 MCG: 50 INJECTION INTRAMUSCULAR; INTRAVENOUS at 16:23

## 2019-10-02 RX ADMIN — CEFAZOLIN SODIUM 2 G: 2 INJECTION, SOLUTION INTRAVENOUS at 20:45

## 2019-10-02 RX ADMIN — SODIUM CHLORIDE, PRESERVATIVE FREE 10 ML: 5 INJECTION INTRAVENOUS at 20:45

## 2019-10-02 RX ADMIN — ONDANSETRON 4 MG: 2 INJECTION INTRAMUSCULAR; INTRAVENOUS at 15:09

## 2019-10-02 RX ADMIN — SODIUM CHLORIDE, POTASSIUM CHLORIDE, SODIUM LACTATE AND CALCIUM CHLORIDE 9 ML/HR: 600; 310; 30; 20 INJECTION, SOLUTION INTRAVENOUS at 13:36

## 2019-10-02 RX ADMIN — IOPAMIDOL 85 ML: 612 INJECTION, SOLUTION INTRAVENOUS at 05:54

## 2019-10-02 RX ADMIN — DEXAMETHASONE SODIUM PHOSPHATE 8 MG: 10 INJECTION INTRAMUSCULAR; INTRAVENOUS at 14:57

## 2019-10-02 RX ADMIN — HYDROMORPHONE HYDROCHLORIDE 0.5 MG: 1 INJECTION, SOLUTION INTRAMUSCULAR; INTRAVENOUS; SUBCUTANEOUS at 16:37

## 2019-10-02 RX ADMIN — FAMOTIDINE 20 MG: 10 INJECTION, SOLUTION INTRAVENOUS at 13:46

## 2019-10-02 RX ADMIN — CEFAZOLIN SODIUM 2 G: 2 INJECTION, SOLUTION INTRAVENOUS at 13:20

## 2019-10-02 RX ADMIN — FENTANYL CITRATE 50 MCG: 50 INJECTION INTRAMUSCULAR; INTRAVENOUS at 14:51

## 2019-10-02 RX ADMIN — MIDAZOLAM 1 MG: 1 INJECTION INTRAMUSCULAR; INTRAVENOUS at 14:26

## 2019-10-02 RX ADMIN — ONDANSETRON 4 MG: 2 INJECTION INTRAMUSCULAR; INTRAVENOUS at 22:55

## 2019-10-02 RX ADMIN — POTASSIUM CHLORIDE, DEXTROSE MONOHYDRATE AND SODIUM CHLORIDE 100 ML/HR: 150; 5; 450 INJECTION, SOLUTION INTRAVENOUS at 12:49

## 2019-10-02 RX ADMIN — LORAZEPAM 1 MG: 2 INJECTION INTRAMUSCULAR; INTRAVENOUS at 05:19

## 2019-10-02 RX ADMIN — SODIUM CHLORIDE, PRESERVATIVE FREE 10 ML: 5 INJECTION INTRAVENOUS at 12:49

## 2019-10-02 NOTE — ED PROVIDER NOTES
EMERGENCY DEPARTMENT ENCOUNTER    CHIEF COMPLAINT  Chief Complaint: Wound drainage  History given by: Patient  History limited by: None  Room Number: 14/14  PMD: Bon Abebe MD      HPI:  Pt is a 74 y.o. male who presents complaining of drainage from a surgical wound to his abd that began yesterday. Pt also c/o abd pain, but denies fever, nausea, vomiting, constipation, or diarrhea. Pt had a hernia repair by Dr. Kong on 8/21/19.    Duration: Began yesterday  Onset: Gradual  Timing: Constant  Location: Abd  Intensity/Severity: Moderate  Progression: Unchanged  Associated Symptoms: Abd pain  Previous Episodes: Pt had a hernia repair by Dr. Kong on 8/21/19.  Treatment before arrival: None    PAST MEDICAL HISTORY  Active Ambulatory Problems     Diagnosis Date Noted   • Encounter for screening colonoscopy 07/30/2019   • Infected hernioplasty mesh (CMS/[facility]) 07/30/2019   • History of colon polyps 08/08/2019     Resolved Ambulatory Problems     Diagnosis Date Noted   • No Resolved Ambulatory Problems     Past Medical History:   Diagnosis Date   • Hernia of abdominal wall    • Hyperlipidemia    • Hypertension    • Wears partial dentures        PAST SURGICAL HISTORY  Past Surgical History:   Procedure Laterality Date   • APPENDECTOMY     • COLONOSCOPY N/A 8/8/2019    Procedure: COLONOSCOPY to Cecum;  Surgeon: Bon Kong Jr., MD;  Location: [facility] ENDOSCOPY;  Service: General   • HERNIA REPAIR     • VENTRAL/INCISIONAL HERNIA REPAIR N/A 8/21/2019    Procedure: exploratory laparotomy with mesh removal;  Surgeon: Bon Kong Jr., MD;  Location: [facility] MAIN OR;  Service: General       FAMILY HISTORY  Family History   Problem Relation Age of Onset   • Heart disease Mother    • Malig Hyperthermia Neg Hx        SOCIAL HISTORY  Social History     Socioeconomic History   • Marital status:      Spouse name: Not on file   • Number of children: Not on file   • Years of education: Not on file   • Highest  education level: Not on file   Tobacco Use   • Smoking status: Former Smoker     Last attempt to quit: 1995     Years since quittin.1   • Smokeless tobacco: Former User   Substance and Sexual Activity   • Alcohol use: Yes     Comment: Occasional use   • Drug use: Yes     Types: Marijuana     Comment: last use 2 days ago   • Sexual activity: Defer       ALLERGIES  Latex    REVIEW OF SYSTEMS  Review of Systems   Constitutional: Negative for activity change, appetite change and fever.   HENT: Negative for congestion and sore throat.    Eyes: Negative.    Respiratory: Negative for cough and shortness of breath.    Cardiovascular: Negative for chest pain and leg swelling.   Gastrointestinal: Positive for abdominal pain. Negative for constipation, diarrhea, nausea and vomiting.   Endocrine: Negative.    Genitourinary: Negative for decreased urine volume and dysuria.   Musculoskeletal: Negative for neck pain.   Skin: Negative for rash and wound.        Pt reports drainage from a surgical wound to his abd.   Allergic/Immunologic: Negative.    Neurological: Negative for weakness, numbness and headaches.   Hematological: Negative.    Psychiatric/Behavioral: Negative.    All other systems reviewed and are negative.    Midline surgb sca paryialloy ehiscenm mod induration asurround najera b q fat center of wound serous colored andres castillo from   PHYSICAL EXAM  ED Triage Vitals [10/02/19 0312]   Temp Heart Rate Resp BP SpO2   96.7 °F (35.9 °C) 93 16 -- 96 %      Temp src Heart Rate Source Patient Position BP Location FiO2 (%)   Tympanic Monitor -- -- --       Physical Exam   Constitutional: He is oriented to person, place, and time. No distress.   HENT:   Head: Normocephalic and atraumatic.   Eyes: EOM are normal. Pupils are equal, round, and reactive to light.   Neck: Normal range of motion. Neck supple.   Cardiovascular: Normal rate, regular rhythm and normal heart sounds.   Pulmonary/Chest: Effort normal and breath  sounds normal. No respiratory distress.   Abdominal: Soft. There is no tenderness. There is no rebound and no guarding.   He has a midline surgical scar that is partially dehiscenced with moderate induration surrounding the scar. There is subcutaneous fat coming from the center of the wound with serous colored drainage.   Musculoskeletal: Normal range of motion. He exhibits no edema.   Neurological: He is alert and oriented to person, place, and time. He has normal sensation and normal strength.   Skin: Skin is warm and dry.   Psychiatric: Mood and affect normal.   Nursing note and vitals reviewed.      LAB RESULTS  Lab Results (last 24 hours)     Procedure Component Value Units Date/Time    CBC & Differential [[id number]] Collected:  10/02/19 0506    Specimen:  Blood Updated:  10/02/19 0516    Narrative:       The following orders were created for panel order CBC & Differential.  Procedure                               Abnormality         Status                     ---------                               -----------         ------                     CBC Auto Differential[[id number]]        Abnormal            Final result                 Please view results for these tests on the individual orders.    Comprehensive Metabolic Panel [[id number]]  (Abnormal) Collected:  10/02/19 0506    Specimen:  Blood Updated:  10/02/19 0540     Glucose 98 mg/dL      BUN 5 mg/dL      Creatinine 0.85 mg/dL      Sodium 143 mmol/L      Potassium 3.9 mmol/L      Chloride 104 mmol/L      CO2 25.0 mmol/L      Calcium 8.9 mg/dL      Total Protein 8.0 g/dL      Albumin 4.10 g/dL      ALT (SGPT) 35 U/L      AST (SGOT) 55 U/L      Alkaline Phosphatase 81 U/L      Total Bilirubin 0.3 mg/dL      eGFR  African Amer 107 mL/min/1.73      Globulin 3.9 gm/dL      A/G Ratio 1.1 g/dL      BUN/Creatinine Ratio 5.9     Anion Gap 14.0 mmol/L     Narrative:       GFR Normal >60  Chronic Kidney Disease <60  Kidney Failure <15    Protime-INR [[id number]]   (Normal) Collected:  10/02/19 0506    Specimen:  Blood Updated:  10/02/19 0610     Protime 13.5 Seconds      INR 1.06    CBC Auto Differential [[id number]]  (Abnormal) Collected:  10/02/19 0506    Specimen:  Blood Updated:  10/02/19 0516     WBC 6.89 10*3/mm3      RBC 4.32 10*6/mm3      Hemoglobin 13.4 g/dL      Hematocrit 40.3 %      MCV 93.3 fL      MCH 31.0 pg      MCHC 33.3 g/dL      RDW 14.6 %      RDW-SD 50.3 fl      MPV 9.8 fL      Platelets 200 10*3/mm3      Neutrophil % 37.5 %      Lymphocyte % 51.5 %      Monocyte % 9.3 %      Eosinophil % 1.0 %      Basophil % 0.4 %      Immature Grans % 0.3 %      Neutrophils, Absolute 2.58 10*3/mm3      Lymphocytes, Absolute 3.55 10*3/mm3      Monocytes, Absolute 0.64 10*3/mm3      Eosinophils, Absolute 0.07 10*3/mm3      Basophils, Absolute 0.03 10*3/mm3      Immature Grans, Absolute 0.02 10*3/mm3      nRBC 0.0 /100 WBC           I ordered the above labs and reviewed the results    RADIOLOGY  CT Abdomen Pelvis With Contrast    (Results Pending)        I ordered the above noted radiological studies. Interpreted by radiologist. Reviewed by me in PACS.     PROGRESS AND CONSULTS  ED Course as of Oct 15 2303   Wed Oct 02, 2019   0710 D/W Dr. Kong (gen surg) who will see the patient in the ED.  [WC]   0825 Dr. Kong will take the patient to the OR later today.  [WC]      ED Course User Index  [WC] Isma Camarena MD     0500 Ordered CBC, INR, CMP, and CT abd/pel for further evaluation.    0705 Rechecked with pt and informed him of the results of his work up. Pt understands and agrees with the plan, all questions answered.    MEDICAL DECISION MAKING  Results were reviewed/discussed with the patient and they were also made aware of online access. Pt also made aware that some labs, such as cultures, will not be resulted during ER visit and follow up with PMD is necessary.     MDM  Number of Diagnoses or Management Options     Amount and/or Complexity of Data  Reviewed  Clinical lab tests: ordered and reviewed  Tests in the radiology section of CPT®: ordered and reviewed  Decide to obtain previous medical records or to obtain history from someone other than the patient: yes    Patient Progress  Patient progress: stable         DIAGNOSIS  Final diagnoses:   Abdominal wall abscess at site of surgical wound       DISPOSITION  ADMISSION    Latest Documented Vital Signs:  As of 7:04 AM  BP- 124/81 HR- 93 Temp- 96.7 °F (35.9 °C) (Tympanic) O2 sat- 96%    --  Documentation assistance provided by mine Caputo MD for Dr. Caputo.  Information recorded by the scribmarlen was done at my direction and has been verified and validated by me.     Romi Arthur  10/02/19 0607       Jeramie Caputo MD  10/02/19 0758       Jeramie Caputo MD  10/15/19 1258

## 2019-10-02 NOTE — ED NOTES
Pt had umbilical surgery on August 21st in which pt noticed yellow drainage for a couple of days. Pt denies fever, chills, or pain. Pt is in NAD at this time.     Eusebia Obrien, RN  10/02/19 4280

## 2019-10-02 NOTE — OP NOTE
Surgeon: Bon Kong Jr., M.D.    Assistant: None    Pre-Operative Diagnosis:     Infected mesh of abdominal wall    Post-Operative Diagnosis:    Post-Op Diagnosis Codes:  Infected mesh of abdominal wall    Procedure Performed:     Abdominal wall exploration with removal of infected mesh    Indications:     The patient is a pleasant 74-year-old male who had a ventral hernia repair performed in 2000 and recovered well.  He was seen earlier this year in the office with sinus tracts suspicious for an abdominal wall infection.  CT scan of the abdomen and pelvis showed a complicated fluid collection in the abdominal wall and he was taken to the operating room where infected mesh was removed.  At that time all the mesh seemed to be removed.  He recovered well but return to the emergency room today with drainage developing from his abdominal wall.  CT scan of the abdomen pelvis shows an abdominal wall fluid collection with residual mesh apparent.  He presents for abdominal exploration with removal of mesh.  The patient understands the indications, alternatives, risks, and benefits of the procedure and wishes to proceed.    Procedure:     The patient was identified and taken to the operating room where he was placed in the supine position on the operating table.  He underwent general endotracheal anesthesia and was appropriate monitored throughout the case by the anesthesia personnel.  His abdomen was prepped and draped in the standard surgical fashion.  The previous incision was opened bluntly due to the developing drainage at the skin level.  The abdominal wall was explored and an abscess cavity was entered and cultures were taken.  The area was open fully using both electrocautery to allow full access to the inflammatory cavity within the abdominal wall.  Mesh was identified that was well incorporated in the surrounding tissue.  It was carefully elevated and removed by removing a rim of tissue around it using Bovie  electrocautery to try to extract all the mesh and leave no residual.  After removing the mesh the area was carefully inspected and no residual was apparent but the area was very distorted due to the inflammation.  It was packed with saline soaked gauze followed by sterile dressing.  The sponge, needle, and instrument counts were correct at the end of the case.  The patient tolerated procedure well and was transferred to the recovery room in stable condition.    Estimated Blood Loss:      minimal    Specimens:     Infected mesh    Bon Kong Jr., M.D.

## 2019-10-02 NOTE — PLAN OF CARE
Problem: Patient Care Overview  Goal: Plan of Care Review  Outcome: Ongoing (interventions implemented as appropriate)   10/02/19 0057   Coping/Psychosocial   Plan of Care Reviewed With patient   Plan of Care Review   Progress improving   OTHER   Outcome Summary Pain controlled. VSS. Surgery today. Educated on BP monitoring.      Goal: Individualization and Mutuality  Outcome: Ongoing (interventions implemented as appropriate)    Goal: Discharge Needs Assessment  Outcome: Ongoing (interventions implemented as appropriate)    Goal: Interprofessional Rounds/Family Conf  Outcome: Ongoing (interventions implemented as appropriate)      Problem: Infection, Risk/Actual (Adult)  Goal: Identify Related Risk Factors and Signs and Symptoms  Outcome: Outcome(s) achieved Date Met: 10/02/19    Goal: Infection Prevention/Resolution  Outcome: Ongoing (interventions implemented as appropriate)

## 2019-10-02 NOTE — ANESTHESIA PROCEDURE NOTES
Airway  Urgency: elective    Date/Time: 10/2/2019 2:52 PM  Airway not difficult    General Information and Staff    Patient location during procedure: OR  CRNA: Natalia Desai CRNA    Indications and Patient Condition  Indications for airway management: airway protection    Preoxygenated: yes  MILS maintained throughout  Mask difficulty assessment: 0 - not attempted    Final Airway Details  Final airway type: supraglottic airway      Successful airway: LMA  Size 5    Number of attempts at approach: 1  Assessment: lips, teeth, and gum same as pre-op and atraumatic intubation    Additional Comments  Atraumatic insertion

## 2019-10-02 NOTE — ANESTHESIA POSTPROCEDURE EVALUATION
"Patient: Reggie Coy    Procedure Summary     Date:  10/02/19 Room / Location:  [facility] OR [address] [place] [address] MAIN OR    Anesthesia Start:  1444 Anesthesia Stop:  1555    Procedure:  ABDOMINAL WALL EXPLORATION WITH FOREIGN BODY REMOVAL (N/A ) Diagnosis:      Surgeon:  Bon Kong Jr., MD Provider:  Emiliano Hernandez MD    Anesthesia Type:  general ASA Status:  2          Anesthesia Type: general  Last vitals  BP   137/89 (10/02/19 1630)   Temp   36.4 °C (97.6 °F) (10/02/19 1551)   Pulse   74 (10/02/19 1630)   Resp   16 (10/02/19 1630)     SpO2   98 % (10/02/19 1630)     Post Anesthesia Care and Evaluation    Patient location during evaluation: bedside  Patient participation: complete - patient participated  Level of consciousness: awake and alert  Pain score: 0  Pain management: adequate  Airway patency: patent  Anesthetic complications: No anesthetic complications  PONV Status: none  Cardiovascular status: acceptable and hemodynamically stable  Respiratory status: acceptable and nasal cannula  Hydration status: acceptable    Comments: /89   Pulse 74   Temp 36.4 °C (97.6 °F) (Oral)   Resp 16   Ht 160 cm (63\")   Wt 74.8 kg (165 lb)   SpO2 98%   BMI 29.23 kg/m²         "

## 2019-10-02 NOTE — H&P
Patient Care Team:  Bon Abebe MD as PCP - General (Internal Medicine)    Chief complaint: Abdominal wall infection    Subjective     History of Present Illness     The patient is a very pleasant 74-year-old male who had a previous appendectomy through a midline incision.  He developed a ventral hernia in that incision and has undergone 2 separate ventral hernia repairs.  His most recent ventral hernia repair was in 2000.  He recovered well from that surgery but does have some intermittent pain involving the midline incision. He was seen in our office in late July of this year after he developed chronic drainage from 2 separate sinus tracts along the abdominal wall for several months.  The drainage was clear at times and cloudy at times.    He also had pain that progressively worsened with the drainage.  He was seen in the emergency room on 7/18/2019 with constitutional symptoms including chest pain with shortness of breath and generalized fatigue.  Evaluation was basically normal regarding his heart.  A CT scan of the abdomen and pelvis was performed that showed the mesh from the previous ventral hernia repair with a complicated fluid collection surrounding the mesh.  He was diagnosed with infected mesh.    He was taken to the operating room on 8/21/2019 where he underwent an exploratory laparotomy with removal of the infected mesh.  He did quite well and was discharged to home.  That area completely healed and he seemed to recover well.  He now presents to the emergency room with a drainage from the wound.  A CT scan of the abdomen and pelvis was performed that shows a complicated fluid collection with a small fragment of residual mass still present.    Review of Systems   Constitutional: Positive for fatigue. Negative for activity change, appetite change and fever.   HENT: Negative for trouble swallowing and voice change.    Respiratory: Negative for chest tightness and shortness of breath.     Cardiovascular: Negative for chest pain and palpitations.   Gastrointestinal: Positive for abdominal pain. Negative for blood in stool, constipation, diarrhea, nausea and vomiting.   Endocrine: Negative for cold intolerance and heat intolerance.   Genitourinary: Negative for dysuria and flank pain.   Neurological: Negative for dizziness and light-headedness.   Hematological: Negative for adenopathy. Does not bruise/bleed easily.   Psychiatric/Behavioral: Negative for agitation and confusion.        Past Medical History:   Diagnosis Date   • Hernia of abdominal wall    • Hyperlipidemia    • Hypertension    • Wears partial dentures     UPPER     Past Surgical History:   Procedure Laterality Date   • APPENDECTOMY     • COLONOSCOPY N/A 2019    Procedure: COLONOSCOPY to Cecum;  Surgeon: Bon Kong Jr., MD;  Location: [facility] ENDOSCOPY;  Service: General   • HERNIA REPAIR     • VENTRAL/INCISIONAL HERNIA REPAIR N/A 2019    Procedure: exploratory laparotomy with mesh removal;  Surgeon: Bon Kong Jr., MD;  Location: [facility] MAIN OR;  Service: General     Family History   Problem Relation Age of Onset   • Heart disease Mother    • Malig Hyperthermia Neg Hx      Social History     Tobacco Use   • Smoking status: Former Smoker     Last attempt to quit: 1995     Years since quittin.1   • Smokeless tobacco: Former User   Substance Use Topics   • Alcohol use: Yes     Comment: Occasional use   • Drug use: Yes     Types: Marijuana     Comment: last use 2 days ago     Allergies:  Latex    Objective      Vital Signs  Temp:  [96.7 °F (35.9 °C)-98.1 °F (36.7 °C)] 98.1 °F (36.7 °C)  Heart Rate:  [72-93] 77  Resp:  [16] 16  BP: (121-149)/(73-88) 126/88    Physical Exam   Constitutional: He is oriented to person, place, and time. He appears well-developed and well-nourished.  Non-toxic appearance.   Eyes: EOM are normal. No scleral icterus.   Pulmonary/Chest: Effort normal. No respiratory distress.    Abdominal: Soft. Normal appearance. There is no tenderness.   There is an obvious fluid collection with drainage at the incision and minimal cellulitis present.   Neurological: He is alert and oriented to person, place, and time.   Skin: Skin is warm and dry.   Psychiatric: He has a normal mood and affect. His behavior is normal. Judgment and thought content normal.       Results Review:   I reviewed the patient's new clinical results.      Assessment/Plan       Infected hernioplasty mesh (CMS/[facility])    Essential hypertension      Assessment & Plan     1.  Retained infected mesh: The patient had infected mesh removed from the abdominal wall about 6 weeks ago and seemed to recover well.  He now has a recurrent abdominal wall fluid collection with evidence of a small residual rim of mesh.  He will require abdominal wall expiration with removal of the residual mesh.  The patient understands the indications, alternatives, risks, and benefits of the procedure and wishes to proceed.    I discussed the patients findings and my recommendations with patient    Bon Kong Jr., MD  10/02/19  9:44 AM

## 2019-10-02 NOTE — ED NOTES
Pt ambulatory to triage with c/o abdominal pain - had hernia surgery 8/21, has had pain when he sleeps, and has had drainage for 1 week, states bloody drainage, and wound is still open     Luna Huber, RN  10/02/19 1380

## 2019-10-02 NOTE — ANESTHESIA PREPROCEDURE EVALUATION
Anesthesia Evaluation     Patient summary reviewed and Nursing notes reviewed   NPO Solid Status: > 8 hours  NPO Liquid Status: > 8 hours           Airway   Mallampati: II  Dental          Pulmonary - negative pulmonary ROS and normal exam    breath sounds clear to auscultation  Cardiovascular - normal exam    ECG reviewed    (+) hypertension well controlled 2 medications or greater,       Neuro/Psych- negative ROS  GI/Hepatic/Renal/Endo - negative ROS     Musculoskeletal (-) negative ROS    Abdominal    Substance History - negative use     OB/GYN          Other - negative ROS                       Anesthesia Plan    ASA 2     general     intravenous induction

## 2019-10-03 VITALS
WEIGHT: 165 LBS | DIASTOLIC BLOOD PRESSURE: 76 MMHG | HEART RATE: 83 BPM | BODY MASS INDEX: 29.23 KG/M2 | SYSTOLIC BLOOD PRESSURE: 131 MMHG | HEIGHT: 63 IN | RESPIRATION RATE: 16 BRPM | TEMPERATURE: 97.4 F | OXYGEN SATURATION: 95 %

## 2019-10-03 LAB
ANION GAP SERPL CALCULATED.3IONS-SCNC: 12.2 MMOL/L (ref 5–15)
BASOPHILS # BLD AUTO: 0 10*3/MM3 (ref 0–0.2)
BASOPHILS NFR BLD AUTO: 0 % (ref 0–1.5)
BUN BLD-MCNC: 7 MG/DL (ref 8–23)
BUN/CREAT SERPL: 9.2 (ref 7–25)
CALCIUM SPEC-SCNC: 8.5 MG/DL (ref 8.6–10.5)
CHLORIDE SERPL-SCNC: 96 MMOL/L (ref 98–107)
CO2 SERPL-SCNC: 24.8 MMOL/L (ref 22–29)
CREAT BLD-MCNC: 0.76 MG/DL (ref 0.76–1.27)
DEPRECATED RDW RBC AUTO: 48.2 FL (ref 37–54)
DIFFERENTIAL METHOD BLD: ABNORMAL
EOSINOPHIL # BLD AUTO: 0 10*3/MM3 (ref 0–0.4)
EOSINOPHIL NFR BLD AUTO: 0 % (ref 0.3–6.2)
ERYTHROCYTE [DISTWIDTH] IN BLOOD BY AUTOMATED COUNT: 14 % (ref 12.3–15.4)
GFR SERPL CREATININE-BSD FRML MDRD: 122 ML/MIN/1.73
GLUCOSE BLD-MCNC: 261 MG/DL (ref 65–99)
HCT VFR BLD AUTO: 39.5 % (ref 37.5–51)
HGB BLD-MCNC: 13.3 G/DL (ref 13–17.7)
IMM GRANULOCYTES # BLD AUTO: 0.02 10*3/MM3 (ref 0–0.05)
IMM GRANULOCYTES NFR BLD AUTO: 0.4 % (ref 0–0.5)
LYMPHOCYTES # BLD AUTO: 0.89 10*3/MM3 (ref 0.7–3.1)
LYMPHOCYTES NFR BLD AUTO: 15.9 % (ref 19.6–45.3)
MCH RBC QN AUTO: 31.4 PG (ref 26.6–33)
MCHC RBC AUTO-ENTMCNC: 33.7 G/DL (ref 31.5–35.7)
MCV RBC AUTO: 93.4 FL (ref 79–97)
MONOCYTES # BLD AUTO: 0.25 10*3/MM3 (ref 0.1–0.9)
MONOCYTES NFR BLD AUTO: 4.5 % (ref 5–12)
NEUTROPHILS # BLD AUTO: 4.43 10*3/MM3 (ref 1.7–7)
NEUTROPHILS NFR BLD AUTO: 79.2 % (ref 42.7–76)
NRBC BLD AUTO-RTO: 0 /100 WBC (ref 0–0.2)
PLATELET # BLD AUTO: 171 10*3/MM3 (ref 140–450)
PMV BLD AUTO: 10.5 FL (ref 6–12)
POTASSIUM BLD-SCNC: 4.3 MMOL/L (ref 3.5–5.2)
RBC # BLD AUTO: 4.23 10*6/MM3 (ref 4.14–5.8)
SODIUM BLD-SCNC: 133 MMOL/L (ref 136–145)
WBC # BLD AUTO: 5.59 10*3/MM3 (ref 3.4–10.8)
WBC NRBC COR # BLD: 5.59 10*3/MM3 (ref 3.4–10.8)

## 2019-10-03 PROCEDURE — 85027 COMPLETE CBC AUTOMATED: CPT | Performed by: SURGERY

## 2019-10-03 PROCEDURE — 25010000003 CEFAZOLIN IN DEXTROSE 2-4 GM/100ML-% SOLUTION: Performed by: SURGERY

## 2019-10-03 PROCEDURE — 80048 BASIC METABOLIC PNL TOTAL CA: CPT | Performed by: SURGERY

## 2019-10-03 PROCEDURE — G0378 HOSPITAL OBSERVATION PER HR: HCPCS

## 2019-10-03 RX ORDER — OXYCODONE HYDROCHLORIDE AND ACETAMINOPHEN 5; 325 MG/1; MG/1
TABLET ORAL
Qty: 16 TABLET | Refills: 0 | Status: SHIPPED | OUTPATIENT
Start: 2019-10-03 | End: 2021-04-22

## 2019-10-03 RX ADMIN — AMLODIPINE BESYLATE 5 MG: 5 TABLET ORAL at 07:49

## 2019-10-03 RX ADMIN — SODIUM CHLORIDE, PRESERVATIVE FREE 10 ML: 5 INJECTION INTRAVENOUS at 07:49

## 2019-10-03 RX ADMIN — CEFAZOLIN SODIUM 2 G: 2 INJECTION, SOLUTION INTRAVENOUS at 11:01

## 2019-10-03 RX ADMIN — CEFAZOLIN SODIUM 2 G: 2 INJECTION, SOLUTION INTRAVENOUS at 04:10

## 2019-10-03 RX ADMIN — LISINOPRIL 20 MG: 20 TABLET ORAL at 07:49

## 2019-10-03 NOTE — PLAN OF CARE
Problem: Patient Care Overview  Goal: Plan of Care Review  Outcome: Ongoing (interventions implemented as appropriate)   10/03/19 1311   Coping/Psychosocial   Plan of Care Reviewed With patient   Plan of Care Review   Progress improving   OTHER   Outcome Summary Pain controlled with PO pain medication. Ambulates independently. VSS. Voiding without difficulty. DC home today with home health. Educated about BP monitoring.      Goal: Individualization and Mutuality  Outcome: Ongoing (interventions implemented as appropriate)    Goal: Discharge Needs Assessment  Outcome: Ongoing (interventions implemented as appropriate)    Goal: Interprofessional Rounds/Family Conf  Outcome: Ongoing (interventions implemented as appropriate)      Problem: Infection, Risk/Actual (Adult)  Goal: Infection Prevention/Resolution  Outcome: Ongoing (interventions implemented as appropriate)      Problem: Surgery Nonspecified (Adult)  Goal: Signs and Symptoms of Listed Potential Problems Will be Absent, Minimized or Managed (Surgery Nonspecified)  Outcome: Ongoing (interventions implemented as appropriate)    Goal: Anesthesia/Sedation Recovery  Outcome: Outcome(s) achieved Date Met: 10/03/19

## 2019-10-03 NOTE — PROGRESS NOTES
Continued Stay Note  [facility]     Patient Name: Reggie Coy  MRN: [id number]  Today's Date: 10/3/2019    Admit Date: 10/2/2019    Discharge Plan     Row Name 10/03/19 1525       Plan    Plan  [facility]    Patient/Family in Agreement with Plan  yes    Plan Comments  Spoke with pt, verified correct information on facesheet and explained the role of CCP. Pt would like to d/c home with [facility], referral given to Kay with [facility] who states they are able to accept. Plan will be to d/c home with [facility] and family support. No other needs identified.     Final Discharge Disposition Code  06 - home with home health care    Final Note  Pt to d/c home with [facility], notified Kay of d/c orders.        Discharge Codes    No documentation.       Expected Discharge Date and Time     Expected Discharge Date Expected Discharge Time    Oct 3, 2019             Stacey Arndt RN

## 2019-10-03 NOTE — PLAN OF CARE
Problem: Patient Care Overview  Goal: Plan of Care Review  Outcome: Ongoing (interventions implemented as appropriate)   10/03/19 0343   Coping/Psychosocial   Plan of Care Reviewed With patient   Plan of Care Review   Progress improving   OTHER   Outcome Summary POD 0 abd wall exploration and foriegn body removal. VSS, pain well controlled with prn percocet, PONV alleviated with prn zofran. dsg change performed per orders, tolerated well. client desires to d/c home with  10/3. discussed BP monitoring r/t hx HTN.      Goal: Individualization and Mutuality  Outcome: Ongoing (interventions implemented as appropriate)    Goal: Discharge Needs Assessment  Outcome: Ongoing (interventions implemented as appropriate)      Problem: Infection, Risk/Actual (Adult)  Goal: Infection Prevention/Resolution  Outcome: Ongoing (interventions implemented as appropriate)      Problem: Surgery Nonspecified (Adult)  Goal: Signs and Symptoms of Listed Potential Problems Will be Absent, Minimized or Managed (Surgery Nonspecified)  Outcome: Ongoing (interventions implemented as appropriate)    Goal: Anesthesia/Sedation Recovery  Outcome: Ongoing (interventions implemented as appropriate)

## 2019-10-03 NOTE — DISCHARGE PLACEMENT REQUEST
"Reggie Tyson (74 y.o. Male)     Date of Birth Social Security Number Address Home Phone MRN    1945  [id number] [facility] [address] [phone] [id number]    Buddhism Marital Status          None        Admission Date Admission Type Admitting Provider Attending Provider Department, Room/Bed    10/2/19 Emergency Bon Kong Jr., MD George, Anthony Jr., MD [address], P887/1    Discharge Date Discharge Disposition Discharge Destination                       Attending Provider:  Bon Kong Jr., MD    Allergies:  Latex    Isolation:  None   Infection:  None   Code Status:  CPR    Ht:  160 cm (63\")   Wt:  74.8 kg (165 lb)    Admission Cmt:  None   Principal Problem:  Infected hernioplasty mesh (CMS/[facility]) [T85.79XA] More...                 Active Insurance as of 10/2/2019     Primary Coverage     Payor Plan Insurance Group Employer/Plan Group    HUMANA MEDICARE REPLACEMENT HUMANA MEDICARE REPL [id number]     Payor Plan Address Payor Plan Phone Number Payor Plan Fax Number Effective Dates    [address] [address] [phone]  7/1/2019 - None Entered    [facility] [id number]       Subscriber Name Subscriber Birth Date Member ID       REGGIE TYSON 1945 [id number]                 Emergency Contacts      (Rel.) Home Phone Work Phone Mobile Phone    danielle tyson (Spouse) [phone] -- [phone]        "

## 2019-10-03 NOTE — DISCHARGE SUMMARY
Discharge Summary    Date of admission: 10/2/2019    Date of discharge: 10/3/2019    Final diagnosis:    1.  Infected mesh of abdominal wall    Procedures performed during admission:    1.  Abdominal wall exploration with removal of infected mesh on 10/2/2019    Consulting physicians:    1.  None    Reason for admission:    The patient is a pleasant 74-year-old male with a previous ventral hernia repair almost 20 years ago who developed infected mesh to the abdominal wall.  He underwent an exploratory laparotomy with removal of infected mesh about 6 weeks ago and recovered well.  He presented to the emergency room on 10/2/2019 with wound drainage and a CT scan of the abdomen pelvis showed an abdominal wall fluid collection with a small residual amount of mesh in place.  He was admitted for further management.    Hospital course:    The patient was admitted on 10/2/2019 and taken to the operating room where he underwent an abdominal wall expiration with removal of the residual retained mesh.  The wound was packed and he was transferred to the floor postoperatively.  He has done quite well with minimal abdominal pain.  The wound is clean.  Home health will be arranged for dressing changes.  He is ready for discharge to home.    Prescriptions:    He was given a prescription for Percocet.    Follow-up:    Follow-up with me in 1 week.    Bon Kong Jr., M.D.

## 2019-10-04 LAB
BACTERIA SPEC AEROBE CULT: ABNORMAL
GRAM STN SPEC: ABNORMAL
GRAM STN SPEC: ABNORMAL
LAB AP CASE REPORT: NORMAL
PATH REPORT.FINAL DX SPEC: NORMAL
PATH REPORT.GROSS SPEC: NORMAL

## 2019-10-08 ENCOUNTER — OFFICE VISIT (OUTPATIENT)
Dept: SURGERY | Facility: CLINIC | Age: 74
End: 2019-10-08

## 2019-10-08 VITALS — HEART RATE: 72 BPM | OXYGEN SATURATION: 99 %

## 2019-10-08 DIAGNOSIS — Z48.89 POSTOPERATIVE VISIT: Primary | ICD-10-CM

## 2019-10-08 PROCEDURE — 99024 POSTOP FOLLOW-UP VISIT: CPT | Performed by: SURGERY

## 2019-10-08 NOTE — PROGRESS NOTES
Subjective   Reggie Coy is a 74 y.o. male who returns to the office after undergoing an abdominal wall exploration with removal of residual mesh on 10/2/2019.     History of Present Illness     The patient is recovering well with no significant postop symptoms.  He is having no abdominal pain.  He has a good appetite and normal bowel function.  His energy level is good.  His wife is helping with dressing changes and he is tolerating these well.    Review of Systems   Constitutional: Negative for activity change, appetite change, fatigue and fever.   Respiratory: Negative for chest tightness and shortness of breath.    Cardiovascular: Negative for chest pain and palpitations.   Gastrointestinal: Negative for abdominal pain, constipation, diarrhea and nausea.   Skin: Negative for rash and wound.   Psychiatric/Behavioral: Negative for agitation and confusion.       Objective   Physical Exam   Constitutional:  Non-toxic appearance. He does not appear ill. No distress.   Pulmonary/Chest: Effort normal. No respiratory distress.   Abdominal: Soft. Normal appearance. There is no tenderness.   Neurological: He is alert.   Skin:   Wound: Clean with base of granulation tissue and no evidence of infection.   Psychiatric: He has a normal mood and affect. His behavior is normal.       Assessment/Plan   The encounter diagnosis was Postoperative visit.    The patient is recovering well from his abdominal wall exploration with removal of residual mesh.  The wound is healing well and he is recovering quite well.  He and his wife were encouraged to continue local wound care.  He will follow-up in 1 month.

## 2019-11-12 ENCOUNTER — OFFICE VISIT (OUTPATIENT)
Dept: SURGERY | Facility: CLINIC | Age: 74
End: 2019-11-12

## 2019-11-12 VITALS — HEART RATE: 85 BPM | OXYGEN SATURATION: 99 %

## 2019-11-12 DIAGNOSIS — Z48.89 POSTOPERATIVE VISIT: Primary | ICD-10-CM

## 2019-11-12 PROCEDURE — 99024 POSTOP FOLLOW-UP VISIT: CPT | Performed by: SURGERY

## 2019-11-12 NOTE — PROGRESS NOTES
Subjective   Reggie Coy is a 74 y.o. male who returns to the office after undergoing an abdominal wall exploration with removal of residual mesh on 10/2/2019.     History of Present Illness     The patient is recovering well with no significant postop symptoms.  The wound is almost healed but his wife was concerned about an odor present.    Review of Systems   Constitutional: Negative for activity change, appetite change, fatigue and fever.   Respiratory: Negative for chest tightness and shortness of breath.    Cardiovascular: Negative for chest pain and palpitations.   Gastrointestinal: Negative for abdominal pain, constipation, diarrhea and nausea.   Skin: Negative for rash and wound.   Psychiatric/Behavioral: Negative for agitation and confusion.       Objective   Physical Exam   Constitutional:  Non-toxic appearance. He does not appear ill. No distress.   Pulmonary/Chest: Effort normal. No respiratory distress.   Abdominal: Soft. Normal appearance. There is no tenderness.   Neurological: He is alert.   Skin:   Wound: Open with clean base of granulation tissue and no evidence of infection.   Psychiatric: He has a normal mood and affect. His behavior is normal.       Assessment/Plan   The encounter diagnosis was Postoperative visit.    The patient is recovering well from the abdominal wall expiration with removal of residual mesh.  The wound is almost healed and there is no evidence of infection.  His wife is advised to use half-strength peroxide to clean the area and address the odor.  Otherwise he will continue current wound care until it is completely healed.  He will follow-up on an as-needed basis.

## 2020-01-31 ENCOUNTER — HOSPITAL ENCOUNTER (EMERGENCY)
Facility: HOSPITAL | Age: 75
Discharge: HOME | End: 2020-01-31
Attending: EMERGENCY MEDICINE | Admitting: EMERGENCY MEDICINE

## 2020-01-31 ENCOUNTER — APPOINTMENT (OUTPATIENT)
Dept: CT IMAGING | Facility: HOSPITAL | Age: 75
End: 2020-01-31

## 2020-01-31 VITALS
WEIGHT: 165 LBS | RESPIRATION RATE: 18 BRPM | HEIGHT: 63 IN | TEMPERATURE: 98 F | OXYGEN SATURATION: 95 % | DIASTOLIC BLOOD PRESSURE: 79 MMHG | HEART RATE: 87 BPM | BODY MASS INDEX: 29.23 KG/M2 | SYSTOLIC BLOOD PRESSURE: 144 MMHG

## 2020-01-31 DIAGNOSIS — R79.89 ELEVATED LFTS: ICD-10-CM

## 2020-01-31 DIAGNOSIS — Z48.89 ENCOUNTER FOR POSTOPERATIVE WOUND CHECK: Primary | ICD-10-CM

## 2020-01-31 LAB
ALBUMIN SERPL-MCNC: 4.1 G/DL (ref 3.5–5.2)
ALBUMIN/GLOB SERPL: 1.3 G/DL
ALP SERPL-CCNC: 64 U/L (ref 39–117)
ALT SERPL W P-5'-P-CCNC: 64 U/L (ref 1–41)
ANION GAP SERPL CALCULATED.3IONS-SCNC: 17.6 MMOL/L (ref 5–15)
AST SERPL-CCNC: 116 U/L (ref 1–40)
BASOPHILS # BLD AUTO: 0.02 10*3/MM3 (ref 0–0.2)
BASOPHILS NFR BLD AUTO: 0.3 % (ref 0–1.5)
BILIRUB SERPL-MCNC: 0.2 MG/DL (ref 0.2–1.2)
BUN BLD-MCNC: 6 MG/DL (ref 8–23)
BUN/CREAT SERPL: 8.5 (ref 7–25)
CALCIUM SPEC-SCNC: 8.6 MG/DL (ref 8.6–10.5)
CHLORIDE SERPL-SCNC: 98 MMOL/L (ref 98–107)
CO2 SERPL-SCNC: 21.4 MMOL/L (ref 22–29)
CREAT BLD-MCNC: 0.71 MG/DL (ref 0.76–1.27)
DEPRECATED RDW RBC AUTO: 43 FL (ref 37–54)
EOSINOPHIL # BLD AUTO: 0.01 10*3/MM3 (ref 0–0.4)
EOSINOPHIL NFR BLD AUTO: 0.2 % (ref 0.3–6.2)
ERYTHROCYTE [DISTWIDTH] IN BLOOD BY AUTOMATED COUNT: 12.8 % (ref 12.3–15.4)
GFR SERPL CREATININE-BSD FRML MDRD: 132 ML/MIN/1.73
GLOBULIN UR ELPH-MCNC: 3.1 GM/DL
GLUCOSE BLD-MCNC: 111 MG/DL (ref 65–99)
HCT VFR BLD AUTO: 40.5 % (ref 37.5–51)
HGB BLD-MCNC: 14.2 G/DL (ref 13–17.7)
IMM GRANULOCYTES # BLD AUTO: 0.01 10*3/MM3 (ref 0–0.05)
IMM GRANULOCYTES NFR BLD AUTO: 0.2 % (ref 0–0.5)
LYMPHOCYTES # BLD AUTO: 1.68 10*3/MM3 (ref 0.7–3.1)
LYMPHOCYTES NFR BLD AUTO: 28.6 % (ref 19.6–45.3)
MCH RBC QN AUTO: 32.6 PG (ref 26.6–33)
MCHC RBC AUTO-ENTMCNC: 35.1 G/DL (ref 31.5–35.7)
MCV RBC AUTO: 92.9 FL (ref 79–97)
MONOCYTES # BLD AUTO: 0.69 10*3/MM3 (ref 0.1–0.9)
MONOCYTES NFR BLD AUTO: 11.7 % (ref 5–12)
NEUTROPHILS # BLD AUTO: 3.47 10*3/MM3 (ref 1.7–7)
NEUTROPHILS NFR BLD AUTO: 59 % (ref 42.7–76)
NRBC BLD AUTO-RTO: 0 /100 WBC (ref 0–0.2)
PLATELET # BLD AUTO: 104 10*3/MM3 (ref 140–450)
PMV BLD AUTO: 11.4 FL (ref 6–12)
POTASSIUM BLD-SCNC: 3.4 MMOL/L (ref 3.5–5.2)
PROT SERPL-MCNC: 7.2 G/DL (ref 6–8.5)
RBC # BLD AUTO: 4.36 10*6/MM3 (ref 4.14–5.8)
SODIUM BLD-SCNC: 137 MMOL/L (ref 136–145)
WBC NRBC COR # BLD: 5.88 10*3/MM3 (ref 3.4–10.8)

## 2020-01-31 PROCEDURE — 25010000002 IOPAMIDOL 61 % SOLUTION: Performed by: EMERGENCY MEDICINE

## 2020-01-31 PROCEDURE — 74177 CT ABD & PELVIS W/CONTRAST: CPT

## 2020-01-31 PROCEDURE — 99283 EMERGENCY DEPT VISIT LOW MDM: CPT

## 2020-01-31 PROCEDURE — 85025 COMPLETE CBC W/AUTO DIFF WBC: CPT | Performed by: PHYSICIAN ASSISTANT

## 2020-01-31 PROCEDURE — 80053 COMPREHEN METABOLIC PANEL: CPT | Performed by: PHYSICIAN ASSISTANT

## 2020-01-31 RX ORDER — SODIUM CHLORIDE 0.9 % (FLUSH) 0.9 %
10 SYRINGE (ML) INJECTION AS NEEDED
Status: DISCONTINUED | OUTPATIENT
Start: 2020-01-31 | End: 2020-01-31 | Stop reason: HOSPADM

## 2020-01-31 RX ADMIN — IOPAMIDOL 85 ML: 612 INJECTION, SOLUTION INTRAVENOUS at 19:08

## 2020-02-01 NOTE — DISCHARGE INSTRUCTIONS
Use abdominal binder while up during the day.  Continue wound dressing.        Return for any worsening symptoms.

## 2020-02-13 ENCOUNTER — OFFICE VISIT (OUTPATIENT)
Dept: SURGERY | Facility: CLINIC | Age: 75
End: 2020-02-13

## 2020-02-13 VITALS — OXYGEN SATURATION: 99 % | HEART RATE: 106 BPM

## 2020-02-13 DIAGNOSIS — Z48.89 POSTOPERATIVE VISIT: Primary | ICD-10-CM

## 2020-02-13 PROCEDURE — 99213 OFFICE O/P EST LOW 20 MIN: CPT | Performed by: SURGERY

## 2020-02-20 NOTE — PROGRESS NOTES
Delilah Coy is a 74 y.o. male who returns to the office for an open wound at his previous midline incision.    History of Present Illness     The patient had a previous ventral hernia repair about 15 years ago and developed an infection of the mesh in August 2019.  He was taken to the operating room and underwent an exploratory laparotomy with removal of infected mesh on 8/21/2019.  He did well postoperatively but then developed a draining sinus tract related to some residual mesh in the abdominal wall.  He was taken back to the operating room on 10/2/2019 for an abdominal wall exploration with removal of infected mesh.  He completely recovered from that procedure and returned to his normal state of health when he developed the flu with excessive coughing and had an opening in the incision.  He presented to the emergency room on 1/31/2020 where a CT scan of the abdomen and pelvis showed no subcutaneous infection.  He presents to our office for evaluation and is not having any pain from the open incision.  There is no drainage at this time.    Review of Systems   Constitutional: Negative for fatigue and fever.   Respiratory: Negative for chest tightness and shortness of breath.    Cardiovascular: Negative for chest pain and palpitations.   Gastrointestinal: Negative for abdominal pain, blood in stool, constipation, diarrhea, nausea and vomiting.     Past Medical History:   Diagnosis Date   • Hernia of abdominal wall    • Hyperlipidemia    • Hypertension    • Wears partial dentures     UPPER     Past Surgical History:   Procedure Laterality Date   • APPENDECTOMY     • COLONOSCOPY N/A 8/8/2019    Procedure: COLONOSCOPY to Cecum;  Surgeon: Bon Kong Jr., MD;  Location: [facility] ENDOSCOPY;  Service: General   • EXCISION MASS TRUNK N/A 10/2/2019    Procedure: ABDOMINAL WALL EXPLORATION WITH FOREIGN BODY REMOVAL;  Surgeon: Bon Kong Jr., MD;  Location: [facility] MAIN OR;  Service: General   • HERNIA  REPAIR     • VENTRAL/INCISIONAL HERNIA REPAIR N/A 2019    Procedure: exploratory laparotomy with mesh removal;  Surgeon: Bon Kong Jr., MD;  Location: [facility] OR;  Service: General     Family History   Problem Relation Age of Onset   • Heart disease Mother    • Malig Hyperthermia Neg Hx      Social History     Socioeconomic History   • Marital status:      Spouse name: Not on file   • Number of children: Not on file   • Years of education: Not on file   • Highest education level: Not on file   Tobacco Use   • Smoking status: Former Smoker     Last attempt to quit: 1995     Years since quittin.5   • Smokeless tobacco: Former User   Substance and Sexual Activity   • Alcohol use: Yes     Comment: Occasional use   • Drug use: Yes     Types: Marijuana     Comment: last use 2 days ago   • Sexual activity: Defer       Objective   Physical Exam   Constitutional: He is oriented to person, place, and time. He appears well-developed and well-nourished.  Non-toxic appearance.   Eyes: EOM are normal. No scleral icterus.   Pulmonary/Chest: Effort normal. No respiratory distress.   Abdominal: Soft. Normal appearance. There is no tenderness.   There was a small, less than 1 cm opening in the midline incision with no cellulitis nor infection.  There is a base of granulation tissue.   Neurological: He is alert and oriented to person, place, and time.   Skin: Skin is warm and dry.   Psychiatric: He has a normal mood and affect. His behavior is normal. Judgment and thought content normal.       Assessment/Plan       The encounter diagnosis was Postoperative visit.    The patient has a small opening in the incision with no evidence of infection.  It is suspicious for a residual seroma that drained.  The CT scan of the abdomen and pelvis does not show any collection to suggest residual mesh still present.  This was discussed with the patient.  Local wound care was discussed with him as well.  No treatment  is necessary at this time.  He will follow-up on an as-needed basis.

## 2021-01-15 ENCOUNTER — TELEPHONE (OUTPATIENT)
Dept: FAMILY MEDICINE CLINIC | Facility: CLINIC | Age: 76
End: 2021-01-15

## 2021-04-22 ENCOUNTER — OFFICE VISIT (OUTPATIENT)
Dept: FAMILY MEDICINE CLINIC | Facility: CLINIC | Age: 76
End: 2021-04-22

## 2021-04-22 VITALS
OXYGEN SATURATION: 100 % | TEMPERATURE: 97.7 F | SYSTOLIC BLOOD PRESSURE: 140 MMHG | RESPIRATION RATE: 16 BRPM | DIASTOLIC BLOOD PRESSURE: 80 MMHG | BODY MASS INDEX: 26.58 KG/M2 | WEIGHT: 150 LBS | HEIGHT: 63 IN | HEART RATE: 94 BPM

## 2021-04-22 DIAGNOSIS — E78.49 OTHER HYPERLIPIDEMIA: ICD-10-CM

## 2021-04-22 DIAGNOSIS — R73.09 ELEVATED GLUCOSE: ICD-10-CM

## 2021-04-22 DIAGNOSIS — I10 ESSENTIAL HYPERTENSION: ICD-10-CM

## 2021-04-22 DIAGNOSIS — E55.9 VITAMIN D DEFICIENCY: ICD-10-CM

## 2021-04-22 DIAGNOSIS — F03.90 DEMENTIA WITHOUT BEHAVIORAL DISTURBANCE, UNSPECIFIED DEMENTIA TYPE: Primary | ICD-10-CM

## 2021-04-22 PROBLEM — E78.5 HYPERLIPIDEMIA: Status: ACTIVE | Noted: 2021-04-22

## 2021-04-22 PROBLEM — K21.9 GERD (GASTROESOPHAGEAL REFLUX DISEASE): Status: ACTIVE | Noted: 2017-08-22

## 2021-04-22 PROBLEM — E66.9 OBESITY (BMI 30-39.9): Status: ACTIVE | Noted: 2021-04-22

## 2021-04-22 PROBLEM — F12.90 MARIJUANA USE: Status: ACTIVE | Noted: 2021-04-22

## 2021-04-22 PROBLEM — F10.939 ALCOHOL WITHDRAWAL (HCC): Status: ACTIVE | Noted: 2019-01-02

## 2021-04-22 PROCEDURE — 99204 OFFICE O/P NEW MOD 45 MIN: CPT | Performed by: INTERNAL MEDICINE

## 2021-04-22 RX ORDER — VITAMIN E 268 MG
400 CAPSULE ORAL DAILY
COMMUNITY

## 2021-04-22 RX ORDER — PHENOL 1.4 %
600 AEROSOL, SPRAY (ML) MUCOUS MEMBRANE DAILY
COMMUNITY

## 2021-04-22 NOTE — PROGRESS NOTES
04/22/2021    CC: Memory Loss  .        HPI  Memory Loss  This is a new problem. The current episode started more than 1 month ago. The problem occurs constantly. The problem has been unchanged. Nothing aggravates the symptoms. He has tried nothing for the symptoms.        Delilah Coy is a 75 y.o. male.      The following portions of the patient's history were reviewed and updated as appropriate: allergies, current medications, past family history, past medical history, past social history, past surgical history and problem list.    Problem List  Patient Active Problem List   Diagnosis   • Infected hernioplasty mesh (CMS/HCC)   • Essential hypertension   • Abdominal wall abscess at site of surgical wound   • Alcohol withdrawal (CMS/HCC)   • GERD (gastroesophageal reflux disease)   • Hyperlipidemia   • Marijuana use   • Obesity (BMI 30-39.9)       Past Medical History  Past Medical History:   Diagnosis Date   • Hernia of abdominal wall    • Hyperlipidemia    • Hypertension    • Wears partial dentures     UPPER       Surgical History  Past Surgical History:   Procedure Laterality Date   • APPENDECTOMY     • COLONOSCOPY N/A 8/8/2019    Procedure: COLONOSCOPY to Cecum;  Surgeon: Bon Kong Jr., MD;  Location: [facility] ENDOSCOPY;  Service: General   • EXCISION MASS TRUNK N/A 10/2/2019    Procedure: ABDOMINAL WALL EXPLORATION WITH FOREIGN BODY REMOVAL;  Surgeon: Bon Kong Jr., MD;  Location: [facility] OR;  Service: General   • HERNIA REPAIR     • VENTRAL/INCISIONAL HERNIA REPAIR N/A 8/21/2019    Procedure: exploratory laparotomy with mesh removal;  Surgeon: Bon Kong Jr., MD;  Location: [facility] OR;  Service: General       Family History  Family History   Problem Relation Age of Onset   • Heart disease Mother    • Malig Hyperthermia Neg Hx        Social History  Social History    Tobacco Use      Smoking status: Former Smoker        Quit date: 8/16/1995        Years since quitting:  25.7      Smokeless tobacco: Former User       Is the Patient a current tobacco user? No    Allergies  Allergies   Allergen Reactions   • Latex Itching       Current Medications    Current Outpatient Medications:   •  calcium carbonate (OS-MICHAEL) 600 MG tablet, Take 600 mg by mouth Daily., Disp: , Rfl:   •  Cyanocobalamin (VITAMIN B-12 ER PO), Take 1 tablet by mouth Daily. ON HOLD, Disp: , Rfl:   •  Ferrous Gluconate (IRON 27 PO), Take  by mouth., Disp: , Rfl:   •  GLUCOSAMINE SULFATE PO, Take 1,000 mg by mouth Daily., Disp: , Rfl:   •  Omega-3 Fatty Acids (FISH OIL) 1000 MG capsule capsule, Take 1,000 mg by mouth Daily With Breakfast. ON HOLD, Disp: , Rfl:   •  thiamine (VITAMIN B1) 100 MG tablet, Take 100 mg by mouth Daily. ON HOLD, Disp: , Rfl:   •  Vitamin A 2400 MCG (8000 UT) tablet, Take  by mouth., Disp: , Rfl:   •  vitamin D3 (Vitamin D) 125 MCG (5000 UT) capsule capsule, Take 5,000 Units by mouth Daily., Disp: , Rfl:   •  vitamin E 400 UNIT capsule, Take 400 Units by mouth Daily., Disp: , Rfl:      Review of System  Review of Systems   Eyes: Negative.    Cardiovascular: Negative.    Gastrointestinal: Negative.    Endocrine: Negative.    Neurological: Negative.      I have reviewed and confirmed the accuracy of the ROS as documented by the MA/LPN/RN Bon Abebe MD    Vitals:    04/22/21 1309   BP: 140/80   Pulse: 94   Resp: 16   Temp: 97.7 °F (36.5 °C)   SpO2: 100%     Body mass index is 26.57 kg/m².    Objective     Physical Exam  Physical Exam  Cardiovascular:      Rate and Rhythm: Regular rhythm.      Pulses: Normal pulses.      Heart sounds: Normal heart sounds.   Pulmonary:      Effort: Pulmonary effort is normal.      Breath sounds: Normal breath sounds.   Musculoskeletal:      Cervical back: Normal range of motion and neck supple.         Assessment/Plan      This 65-year-old presents to me after having been lost to follow-up for greater than 8 years.  He is accompanied by his wife who provides  "historical information.  Patient relates that \"I have been told that my memory is going.\"  Although he is a  and has been thoroughly familiar with the old and new testament he is unable to recite the 100th Psalm even with prodding.  He is unable to relate the significance of Peter and is not oriented to time.  There are no recent admissions of blows to the head or loss of consciousness or motor control.    We will see him in follow-up with a [place] evaluation as well as some preliminary labs.    Diagnoses and all orders for this visit:    1. Dementia without behavioral disturbance, unspecified dementia type (CMS/HCC) (Primary)  -     CBC & Differential  -     Hepatitis C Antibody  -     T4, Free  -     TSH  -     RPR  -     Vitamin D 25 hydroxy    2. Essential hypertension  -     Comprehensive Metabolic Panel  -     Urinalysis With Culture If Indicated -    3. Other hyperlipidemia  -     Lipid Panel With / Chol / HDL Ratio    4. Elevated glucose  -     Hemoglobin A1c    5. Vitamin D deficiency  -     Vitamin D 25 hydroxy             Bon Abebe MD  04/22/2021  "

## 2021-04-23 LAB
25(OH)D3+25(OH)D2 SERPL-MCNC: 50.1 NG/ML (ref 30–100)
ALBUMIN SERPL-MCNC: 4 G/DL (ref 3.7–4.7)
ALBUMIN/GLOB SERPL: 1.3 {RATIO} (ref 1.2–2.2)
ALP SERPL-CCNC: 85 IU/L (ref 39–117)
ALT SERPL-CCNC: 59 IU/L (ref 0–44)
AST SERPL-CCNC: 101 IU/L (ref 0–40)
BASOPHILS # BLD AUTO: 0.1 X10E3/UL (ref 0–0.2)
BASOPHILS NFR BLD AUTO: 1 %
BILIRUB SERPL-MCNC: 0.2 MG/DL (ref 0–1.2)
BUN SERPL-MCNC: 10 MG/DL (ref 8–27)
BUN/CREAT SERPL: 14 (ref 10–24)
CALCIUM SERPL-MCNC: 9.4 MG/DL (ref 8.6–10.2)
CHLORIDE SERPL-SCNC: 101 MMOL/L (ref 96–106)
CHOLEST SERPL-MCNC: 194 MG/DL (ref 100–199)
CHOLEST/HDLC SERPL: 3 RATIO (ref 0–5)
CO2 SERPL-SCNC: 23 MMOL/L (ref 20–29)
CREAT SERPL-MCNC: 0.72 MG/DL (ref 0.76–1.27)
EOSINOPHIL # BLD AUTO: 0.1 X10E3/UL (ref 0–0.4)
EOSINOPHIL NFR BLD AUTO: 1 %
ERYTHROCYTE [DISTWIDTH] IN BLOOD BY AUTOMATED COUNT: 14.2 % (ref 11.6–15.4)
GLOBULIN SER CALC-MCNC: 3.2 G/DL (ref 1.5–4.5)
GLUCOSE SERPL-MCNC: 83 MG/DL (ref 65–99)
HBA1C MFR BLD: 5.2 % (ref 4.8–5.6)
HCT VFR BLD AUTO: 38.3 % (ref 37.5–51)
HCV AB S/CO SERPL IA: <0.1 S/CO RATIO (ref 0–0.9)
HDLC SERPL-MCNC: 65 MG/DL
HGB BLD-MCNC: 13.3 G/DL (ref 13–17.7)
IMM GRANULOCYTES # BLD AUTO: 0 X10E3/UL (ref 0–0.1)
IMM GRANULOCYTES NFR BLD AUTO: 0 %
LDLC SERPL CALC-MCNC: 115 MG/DL (ref 0–99)
LYMPHOCYTES # BLD AUTO: 2.8 X10E3/UL (ref 0.7–3.1)
LYMPHOCYTES NFR BLD AUTO: 25 %
MCH RBC QN AUTO: 34.2 PG (ref 26.6–33)
MCHC RBC AUTO-ENTMCNC: 34.7 G/DL (ref 31.5–35.7)
MCV RBC AUTO: 99 FL (ref 79–97)
MONOCYTES # BLD AUTO: 0.9 X10E3/UL (ref 0.1–0.9)
MONOCYTES NFR BLD AUTO: 8 %
NEUTROPHILS # BLD AUTO: 7.3 X10E3/UL (ref 1.4–7)
NEUTROPHILS NFR BLD AUTO: 65 %
PLATELET # BLD AUTO: 346 X10E3/UL (ref 150–450)
POTASSIUM SERPL-SCNC: 4.2 MMOL/L (ref 3.5–5.2)
PROT SERPL-MCNC: 7.2 G/DL (ref 6–8.5)
RBC # BLD AUTO: 3.89 X10E6/UL (ref 4.14–5.8)
RPR SER QL: NON REACTIVE
SODIUM SERPL-SCNC: 139 MMOL/L (ref 134–144)
T4 FREE SERPL-MCNC: 1.12 NG/DL (ref 0.82–1.77)
TRIGL SERPL-MCNC: 75 MG/DL (ref 0–149)
TSH SERPL DL<=0.005 MIU/L-ACNC: 1.73 UIU/ML (ref 0.45–4.5)
VLDLC SERPL CALC-MCNC: 14 MG/DL (ref 5–40)
WBC # BLD AUTO: 11.1 X10E3/UL (ref 3.4–10.8)

## 2021-05-24 ENCOUNTER — OFFICE VISIT (OUTPATIENT)
Dept: FAMILY MEDICINE CLINIC | Facility: CLINIC | Age: 76
End: 2021-05-24

## 2021-05-24 VITALS
RESPIRATION RATE: 16 BRPM | OXYGEN SATURATION: 100 % | HEIGHT: 63 IN | BODY MASS INDEX: 28.17 KG/M2 | SYSTOLIC BLOOD PRESSURE: 140 MMHG | DIASTOLIC BLOOD PRESSURE: 80 MMHG | WEIGHT: 159 LBS

## 2021-05-24 DIAGNOSIS — F10.11 ALCOHOL ABUSE, IN REMISSION: ICD-10-CM

## 2021-05-24 DIAGNOSIS — F19.10 SUBSTANCE ABUSE (HCC): ICD-10-CM

## 2021-05-24 DIAGNOSIS — R41.3 MEMORY LOSS: Primary | ICD-10-CM

## 2021-05-24 PROCEDURE — 99214 OFFICE O/P EST MOD 30 MIN: CPT | Performed by: INTERNAL MEDICINE

## 2021-05-24 RX ORDER — THIAMINE MONONITRATE (VIT B1) 100 MG
TABLET ORAL
Qty: 30 TABLET | Refills: 6 | Status: SHIPPED | OUTPATIENT
Start: 2021-05-24 | End: 2021-07-02

## 2021-05-24 NOTE — PROGRESS NOTES
05/24/2021    CC: Memory Loss (MOCA)  .        HPI  Memory Loss  This is a chronic problem. The current episode started more than 1 month ago. The problem occurs constantly. The problem has been unchanged. Nothing aggravates the symptoms. He has tried nothing for the symptoms.        Subjective   Reggie Coy is a 75 y.o. male.      The following portions of the patient's history were reviewed and updated as appropriate: allergies, current medications, past family history, past medical history, past social history, past surgical history and problem list.    Problem List  Patient Active Problem List   Diagnosis   • Infected hernioplasty mesh (CMS/HCC)   • Essential hypertension   • Abdominal wall abscess at site of surgical wound   • Alcohol withdrawal (CMS/HCC)   • GERD (gastroesophageal reflux disease)   • Hyperlipidemia   • Marijuana use   • Obesity (BMI 30-39.9)       Past Medical History  Past Medical History:   Diagnosis Date   • Hernia of abdominal wall    • Hyperlipidemia    • Hypertension    • Wears partial dentures     UPPER       Surgical History  Past Surgical History:   Procedure Laterality Date   • APPENDECTOMY     • COLONOSCOPY N/A 8/8/2019    Procedure: COLONOSCOPY to Cecum;  Surgeon: Bon Kong Jr., MD;  Location: [facility] ENDOSCOPY;  Service: General   • EXCISION MASS TRUNK N/A 10/2/2019    Procedure: ABDOMINAL WALL EXPLORATION WITH FOREIGN BODY REMOVAL;  Surgeon: Bon Kogn Jr., MD;  Location: [facility] OR;  Service: General   • HERNIA REPAIR     • VENTRAL/INCISIONAL HERNIA REPAIR N/A 8/21/2019    Procedure: exploratory laparotomy with mesh removal;  Surgeon: Bon Kong Jr., MD;  Location: [facility] OR;  Service: General       Family History  Family History   Problem Relation Age of Onset   • Heart disease Mother    • Malig Hyperthermia Neg Hx        Social History  Social History    Tobacco Use      Smoking status: Former Smoker        Quit date: 8/16/1995        Years since  quittin.7      Smokeless tobacco: Former User       Is the Patient a current tobacco user? No    Allergies  Allergies   Allergen Reactions   • Latex Itching       Current Medications    Current Outpatient Medications:   •  calcium carbonate (OS-MICHAEL) 600 MG tablet, Take 600 mg by mouth Daily., Disp: , Rfl:   •  Cyanocobalamin (VITAMIN B-12 ER PO), Take 1 tablet by mouth Daily. ON HOLD, Disp: , Rfl:   •  Ferrous Gluconate (IRON 27 PO), Take  by mouth., Disp: , Rfl:   •  GLUCOSAMINE SULFATE PO, Take 1,000 mg by mouth Daily., Disp: , Rfl:   •  Omega-3 Fatty Acids (FISH OIL) 1000 MG capsule capsule, Take 1,000 mg by mouth Daily With Breakfast. ON HOLD, Disp: , Rfl:   •  thiamine (VITAMIN B1) 100 MG tablet, Take 100 mg by mouth Daily. ON HOLD, Disp: , Rfl:   •  Vitamin A 2400 MCG (8000 UT) tablet, Take  by mouth., Disp: , Rfl:   •  vitamin D3 (Vitamin D) 125 MCG (5000 UT) capsule capsule, Take 5,000 Units by mouth Daily., Disp: , Rfl:   •  vitamin E 400 UNIT capsule, Take 400 Units by mouth Daily., Disp: , Rfl:   •  thiamine (VITAMIN B-1) 100 MG tablet, 1aday, Disp: 30 tablet, Rfl: 6     Review of System  Review of Systems   HENT: Negative.    Eyes: Negative.    Respiratory: Negative.    Cardiovascular: Negative.    Gastrointestinal: Negative.    Endocrine: Negative.    Neurological: Positive for memory problem.   Psychiatric/Behavioral: Positive for hallucinations and sleep disturbance.     I have reviewed and confirmed the accuracy of the ROS as documented by the MA/LPN/RN Bon Abebe MD    Vitals:    21 1503   BP: 140/80   Resp: 16   SpO2: 100%     Body mass index is 28.17 kg/m².    Objective     Physical Exam  Physical Exam  Constitutional:       Appearance: Normal appearance.   HENT:      Head: Normocephalic and atraumatic.   Cardiovascular:      Rate and Rhythm: Regular rhythm.      Pulses: Normal pulses.      Heart sounds: Normal heart sounds.   Pulmonary:      Effort: Pulmonary effort is normal.       Breath sounds: Normal breath sounds.   Abdominal:      General: Abdomen is flat.      Palpations: Abdomen is soft.   Musculoskeletal:         General: Normal range of motion.      Cervical back: Normal range of motion.   Neurological:      Mental Status: He is alert.         Assessment/Plan      This 75-year-old patient presents at this time accompanied by his wife who provides historical information.  Patient was originally scheduled to have a [place] examination done as we have determined that he does have a recent loss of memory however he has sister  this morning and I am concerned that depression may interfere with the accuracy of the Hendricks examination.    The patient's wife presents a diary of his confusion and memory loss over the past 2 weeks.  Some of these incidents involve seeing things and having conversations with people who were not there.  Review of his labs from  shows that his TSH and T4 levels were normal.  His hepatitis C level was normal.  SGOT was 101 but decreased from a prior 157.  SGPT was decreased to 59 from a prior 88.  Note is made that a urine drug screen was planned was not done.  A prior urine drug screen done on 2020 Showed an ethyl alcohol level of 241.  His wife relates that his alcohol intake has decreased substantially over the past month.    I am concerned about acute causes of memory loss and possibly Warnicke's case encephalopathy        Diagnoses and all orders for this visit:    1. Memory loss (Primary)  -     MRI Brain Without Contrast; Future  -     Vitamin B12  -     Rheumatoid Factor, Quant  -     HIV-1/O/2 Ag/Ab w Reflex    Other orders  -     thiamine (VITAMIN B-1) 100 MG tablet; 1aday  Dispense: 30 tablet; Refill: 6         Plan:  1.)  MRI to evaluate for acute causes of memory loss  2.)  Start thiamine 100 mg 1 tab p.o. daily  3.)  Follow-up in 1 to 2 weeks  4.)  Urine drug screen    Bon Abebe MD  2021

## 2021-05-26 LAB
HIV 1+2 AB+HIV1 P24 AG SERPL QL IA: NON REACTIVE
RHEUMATOID FACT SERPL-ACNC: <10 IU/ML (ref 0–13.9)
VIT B12 SERPL-MCNC: 1025 PG/ML (ref 211–946)

## 2021-07-02 ENCOUNTER — OFFICE VISIT (OUTPATIENT)
Dept: FAMILY MEDICINE CLINIC | Facility: CLINIC | Age: 76
End: 2021-07-02

## 2021-07-02 VITALS
DIASTOLIC BLOOD PRESSURE: 78 MMHG | WEIGHT: 162 LBS | HEIGHT: 63 IN | BODY MASS INDEX: 28.7 KG/M2 | SYSTOLIC BLOOD PRESSURE: 120 MMHG | RESPIRATION RATE: 16 BRPM

## 2021-07-02 DIAGNOSIS — R41.3 MEMORY DEFICITS: Primary | ICD-10-CM

## 2021-07-02 PROCEDURE — 99214 OFFICE O/P EST MOD 30 MIN: CPT | Performed by: INTERNAL MEDICINE

## 2021-07-06 NOTE — PROGRESS NOTES
2021    CC: Memory Loss (MOCA)  .        HPI  Memory Loss  This is a chronic problem. The current episode started more than 1 month ago. The problem occurs intermittently. The problem has been unchanged.        Delilah Coy is a 76 y.o. male.      The following portions of the patient's history were reviewed and updated as appropriate: allergies, current medications, past family history, past medical history, past social history, past surgical history and problem list.    Problem List  Patient Active Problem List   Diagnosis   • Infected hernioplasty mesh (CMS/HCC)   • Essential hypertension   • Abdominal wall abscess at site of surgical wound   • Alcohol withdrawal (CMS/HCC)   • GERD (gastroesophageal reflux disease)   • Hyperlipidemia   • Marijuana use   • Obesity (BMI 30-39.9)       Past Medical History  Past Medical History:   Diagnosis Date   • Hernia of abdominal wall    • Hyperlipidemia    • Hypertension    • Wears partial dentures     UPPER       Surgical History  Past Surgical History:   Procedure Laterality Date   • APPENDECTOMY     • COLONOSCOPY N/A 2019    Procedure: COLONOSCOPY to Cecum;  Surgeon: Bon Kong Jr., MD;  Location: [facility] ENDOSCOPY;  Service: General   • EXCISION MASS TRUNK N/A 10/2/2019    Procedure: ABDOMINAL WALL EXPLORATION WITH FOREIGN BODY REMOVAL;  Surgeon: Bon Kong Jr., MD;  Location: [facility] OR;  Service: General   • HERNIA REPAIR     • VENTRAL/INCISIONAL HERNIA REPAIR N/A 2019    Procedure: exploratory laparotomy with mesh removal;  Surgeon: Bon Kong Jr., MD;  Location: [facility] OR;  Service: General       Family History  Family History   Problem Relation Age of Onset   • Heart disease Mother    • Malig Hyperthermia Neg Hx        Social History  Social History    Tobacco Use      Smoking status: Former Smoker        Quit date: 1995        Years since quittin.9      Smokeless tobacco: Former User       Is the Patient  a current tobacco user? No    Allergies  Allergies   Allergen Reactions   • Latex Itching       Current Medications    Current Outpatient Medications:   •  calcium carbonate (OS-MICHAEL) 600 MG tablet, Take 600 mg by mouth Daily., Disp: , Rfl:   •  Cyanocobalamin (VITAMIN B-12 ER PO), Take 1 tablet by mouth Daily. ON HOLD, Disp: , Rfl:   •  Ferrous Gluconate (IRON 27 PO), Take  by mouth., Disp: , Rfl:   •  GLUCOSAMINE SULFATE PO, Take 1,000 mg by mouth Daily., Disp: , Rfl:   •  Omega-3 Fatty Acids (FISH OIL) 1000 MG capsule capsule, Take 1,000 mg by mouth Daily With Breakfast. ON HOLD, Disp: , Rfl:   •  thiamine (VITAMIN B1) 100 MG tablet, Take 100 mg by mouth Daily. ON HOLD, Disp: , Rfl:   •  Vitamin A 2400 MCG (8000 UT) tablet, Take  by mouth., Disp: , Rfl:   •  vitamin D3 (Vitamin D) 125 MCG (5000 UT) capsule capsule, Take 5,000 Units by mouth Daily., Disp: , Rfl:   •  vitamin E 400 UNIT capsule, Take 400 Units by mouth Daily., Disp: , Rfl:      Review of System  Review of Systems   Constitutional: Negative.    HENT: Negative.    Eyes: Negative.    Respiratory: Negative.    Cardiovascular: Negative.    Neurological: Positive for memory problem.   Psychiatric/Behavioral: Negative.      I have reviewed and confirmed the accuracy of the ROS as documented by the MA/LPN/RN Bon Abebe MD    Vitals:    07/02/21 1541   BP: 120/78   Resp: 16     Body mass index is 28.7 kg/m².    Objective     Physical Exam  Physical Exam  Cardiovascular:      Rate and Rhythm: Normal rate and regular rhythm.      Pulses: Normal pulses.      Heart sounds: Normal heart sounds.   Pulmonary:      Effort: Pulmonary effort is normal.      Breath sounds: Normal breath sounds.   Abdominal:      General: Abdomen is flat.      Palpations: Abdomen is soft.   Musculoskeletal:         General: Normal range of motion.      Cervical back: Normal range of motion and neck supple.   Skin:     General: Skin is warm.         Assessment/Plan      This  pleasant 76-year-old presents at this time for further evaluation of memory deficits.  He is accompanied by his wife who gives historical information.  She also wrote me a separate note relating her concern regarding the patient's awakening in the middle the night and not going to sleep until early in the morning and changes n his recent memory.    We administered the [place] cognitive asessment test and found that in this patient whose head 2 years of college education his scores wre below normal at 22.    I feel that further testing is needed to help delineate the patient's cause of recent memory decline and would need to discuss with patient whether or not he should be started on medications for early Alzheimer's.  We'll pursue this with MRI evalution of the brain and further testing to evaluate for depression.  We'll see the patient back for discussion in the next few weeks.            Diagnoses and all orders for this visit:    1. Memory deficits (Primary)      Plan:  1.)  Follow-up in 3-4 weeks with a PHQ9 and more discussion regarding memory decline.         Bon Abebe MD  07/02/2021

## 2021-08-05 ENCOUNTER — OFFICE VISIT (OUTPATIENT)
Dept: FAMILY MEDICINE CLINIC | Facility: CLINIC | Age: 76
End: 2021-08-05

## 2021-08-05 VITALS
SYSTOLIC BLOOD PRESSURE: 110 MMHG | HEIGHT: 63 IN | BODY MASS INDEX: 28.81 KG/M2 | DIASTOLIC BLOOD PRESSURE: 80 MMHG | RESPIRATION RATE: 16 BRPM | WEIGHT: 162.6 LBS

## 2021-08-05 DIAGNOSIS — F22 DELUSIONS (HCC): Primary | ICD-10-CM

## 2021-08-05 DIAGNOSIS — R41.3 MEMORY LOSS: Chronic | ICD-10-CM

## 2021-08-05 PROCEDURE — 99214 OFFICE O/P EST MOD 30 MIN: CPT | Performed by: INTERNAL MEDICINE

## 2021-08-05 RX ORDER — DONEPEZIL HYDROCHLORIDE 5 MG/1
5 TABLET, FILM COATED ORAL NIGHTLY
Qty: 30 TABLET | Refills: 3 | Status: SHIPPED | OUTPATIENT
Start: 2021-08-05 | End: 2022-01-10 | Stop reason: DRUGHIGH

## 2021-08-08 NOTE — PROGRESS NOTES
2021    CC: Depression (PHQ9) and Memory Loss (f/u...no other issues)  .        HPI  Depression  Memory Loss         Subjective   Reggie Coy is a 76 y.o. male.      The following portions of the patient's history were reviewed and updated as appropriate: allergies, current medications, past family history, past medical history, past social history, past surgical history and problem list.    Problem List  Patient Active Problem List   Diagnosis   • Infected hernioplasty mesh (CMS/[facility])   • Essential hypertension   • Abdominal wall abscess at site of surgical wound   • Alcohol withdrawal (CMS/HCC)   • GERD (gastroesophageal reflux disease)   • Hyperlipidemia   • Marijuana use   • Obesity (BMI 30-39.9)       Past Medical History  Past Medical History:   Diagnosis Date   • Hernia of abdominal wall    • Hyperlipidemia    • Hypertension    • Wears partial dentures     UPPER       Surgical History  Past Surgical History:   Procedure Laterality Date   • APPENDECTOMY     • COLONOSCOPY N/A 2019    Procedure: COLONOSCOPY to Cecum;  Surgeon: Bon Kong Jr., MD;  Location: [facility] ENDOSCOPY;  Service: General   • EXCISION MASS TRUNK N/A 10/2/2019    Procedure: ABDOMINAL WALL EXPLORATION WITH FOREIGN BODY REMOVAL;  Surgeon: Bon Kong Jr., MD;  Location: [facility] OR;  Service: General   • HERNIA REPAIR     • VENTRAL/INCISIONAL HERNIA REPAIR N/A 2019    Procedure: exploratory laparotomy with mesh removal;  Surgeon: Bon Kong Jr., MD;  Location: [facility] OR;  Service: General       Family History  Family History   Problem Relation Age of Onset   • Heart disease Mother    • Malig Hyperthermia Neg Hx        Social History  Social History    Tobacco Use      Smoking status: Former Smoker        Quit date: 1995        Years since quittin.9      Smokeless tobacco: Former User       Is the Patient a current tobacco user? No    Allergies  Allergies   Allergen Reactions   • Latex Itching        Current Medications    Current Outpatient Medications:   •  calcium carbonate (OS-MICHAEL) 600 MG tablet, Take 600 mg by mouth Daily., Disp: , Rfl:   •  Cyanocobalamin (VITAMIN B-12 ER PO), Take 1 tablet by mouth Daily. ON HOLD, Disp: , Rfl:   •  Ferrous Gluconate (IRON 27 PO), Take  by mouth., Disp: , Rfl:   •  GLUCOSAMINE SULFATE PO, Take 1,000 mg by mouth Daily., Disp: , Rfl:   •  Omega-3 Fatty Acids (FISH OIL) 1000 MG capsule capsule, Take 1,000 mg by mouth Daily With Breakfast. ON HOLD, Disp: , Rfl:   •  thiamine (VITAMIN B1) 100 MG tablet, Take 100 mg by mouth Daily. ON HOLD, Disp: , Rfl:   •  Vitamin A 2400 MCG (8000 UT) tablet, Take  by mouth., Disp: , Rfl:   •  vitamin D3 (Vitamin D) 125 MCG (5000 UT) capsule capsule, Take 5,000 Units by mouth Daily., Disp: , Rfl:   •  vitamin E 400 UNIT capsule, Take 400 Units by mouth Daily., Disp: , Rfl:   •  donepezil (Aricept) 5 MG tablet, Take 1 tablet by mouth Every Night., Disp: 30 tablet, Rfl: 3     Review of System  Review of Systems   Constitutional: Negative.    Eyes: Negative.    Respiratory: Negative.    Cardiovascular: Negative.    Gastrointestinal: Negative.    Neurological: Positive for memory problem.     I have reviewed and confirmed the accuracy of the ROS as documented by the MA/LPN/RN Bon Abebe MD    Vitals:    08/05/21 1514   BP: 110/80   Resp: 16     Body mass index is 28.8 kg/m².    Objective     Physical Exam  Physical Exam  Cardiovascular:      Rate and Rhythm: Normal rate and regular rhythm.      Pulses: Normal pulses.      Heart sounds: Normal heart sounds.   Pulmonary:      Effort: Pulmonary effort is normal.      Breath sounds: Normal breath sounds.   Abdominal:      Palpations: Abdomen is soft.   Neurological:      General: No focal deficit present.      Mental Status: He is alert and oriented to person, place, and time.         Assessment/Plan    This 76-year-old patient presents at this time accompanied by his wife who provides  historical information.  Patient was seen by us approximately 6 weeks or so ago for evaluation of memory deficit.  His score on the [place] cognitive assessment score was 22.  He still has not had the MRI done that we requested it apparently the scheduling lab call several times but the patient's phone was disconnected and so they were unable to schedule the MRI.  He and his wife will correct that today.    His wife also relates that he over the past several weeks has been very accusatory of various problems of the family of taking things conspiring against him etc.    The patient admits that his memory has slipped somewhat over the past year or so.  After discussion regarding his MOCA evaluation he is agreeable to starting Aricept at this time.    To evaluate more the concern about delusions of persecution we'll have him evaluated by psychiatry as well.    We'll also have the patient evaluated by neurology regarding his memory and loss and we'll see him back for follow-up in the next 4-6 weeks for evaluation of the Aricept.          Diagnoses and all orders for this visit:    1. Delusions (CMS/HCC) (Primary)  -     Ambulatory Referral to Psychiatry    2. Memory loss    Other orders  -     donepezil (Aricept) 5 MG tablet; Take 1 tablet by mouth Every Night.  Dispense: 30 tablet; Refill: 3      Plan:  #1.)  Follow-up for further evaluation of depression  #2.)  Start Aricept 5 mg 1 tab by mouth daily at bedtime  #3.)  Referral to psychiatry for evaluation of delusions of persecution  #4.)  Referral to neurology for further evaluation of memory loss/dementia.  #5.)  Follow-up in the next several weeks for evaluation of medication  #6.)  Awaiting MRI of the brain       Bon Abebe MD  08/05/2021

## 2021-09-01 ENCOUNTER — APPOINTMENT (OUTPATIENT)
Dept: MRI IMAGING | Facility: HOSPITAL | Age: 76
End: 2021-09-01

## 2021-09-09 ENCOUNTER — OFFICE VISIT (OUTPATIENT)
Dept: FAMILY MEDICINE CLINIC | Facility: CLINIC | Age: 76
End: 2021-09-09

## 2021-09-09 VITALS
HEIGHT: 63 IN | BODY MASS INDEX: 29.2 KG/M2 | DIASTOLIC BLOOD PRESSURE: 70 MMHG | WEIGHT: 164.8 LBS | SYSTOLIC BLOOD PRESSURE: 130 MMHG | RESPIRATION RATE: 16 BRPM

## 2021-09-09 DIAGNOSIS — R41.3 MEMORY LOSS: Primary | ICD-10-CM

## 2021-09-09 DIAGNOSIS — F40.240 CLAUSTROPHOBIA: ICD-10-CM

## 2021-09-09 PROCEDURE — 99214 OFFICE O/P EST MOD 30 MIN: CPT | Performed by: INTERNAL MEDICINE

## 2021-09-09 RX ORDER — LORAZEPAM 0.5 MG/1
0.5 TABLET ORAL DAILY
Qty: 3 TABLET | Refills: 0 | Status: SHIPPED | OUTPATIENT
Start: 2021-09-09

## 2021-09-09 NOTE — PROGRESS NOTES
09/09/2021    CC: Memory Loss (f/u...MRI scheduled for 9/20/21.)  .        HPI  Anxiety  Presents for initial visit. Onset was in the past 7 days. The problem has been waxing and waning. Symptoms include dry mouth, excessive worry and hyperventilation. Symptoms occur rarely. The symptoms are aggravated by specific phobias.     There are no known risk factors.        Delilah Coy is a 76 y.o. male.      The following portions of the patient's history were reviewed and updated as appropriate: allergies, current medications, past family history, past medical history, past social history, past surgical history and problem list.    Problem List  Patient Active Problem List   Diagnosis   • Infected hernioplasty mesh (CMS/HCC)   • Essential hypertension   • Abdominal wall abscess at site of surgical wound   • Alcohol withdrawal (CMS/HCC)   • GERD (gastroesophageal reflux disease)   • Hyperlipidemia   • Marijuana use   • Obesity (BMI 30-39.9)       Past Medical History  Past Medical History:   Diagnosis Date   • Hernia of abdominal wall    • Hyperlipidemia    • Hypertension    • Wears partial dentures     UPPER       Surgical History  Past Surgical History:   Procedure Laterality Date   • APPENDECTOMY     • COLONOSCOPY N/A 8/8/2019    Procedure: COLONOSCOPY to Cecum;  Surgeon: Bon Kong Jr., MD;  Location: [facility] ENDOSCOPY;  Service: General   • EXCISION MASS TRUNK N/A 10/2/2019    Procedure: ABDOMINAL WALL EXPLORATION WITH FOREIGN BODY REMOVAL;  Surgeon: Bon Kong Jr., MD;  Location: [facility] OR;  Service: General   • HERNIA REPAIR     • VENTRAL/INCISIONAL HERNIA REPAIR N/A 8/21/2019    Procedure: exploratory laparotomy with mesh removal;  Surgeon: Bon Kong Jr., MD;  Location: [facility] OR;  Service: General       Family History  Family History   Problem Relation Age of Onset   • Heart disease Mother    • Malig Hyperthermia Neg Hx        Social History  Social History    Tobacco Use       Smoking status: Former Smoker        Quit date: 1995        Years since quittin.0      Smokeless tobacco: Former User       Is the Patient a current tobacco user? No    Allergies  Allergies   Allergen Reactions   • Latex Itching       Current Medications    Current Outpatient Medications:   •  calcium carbonate (OS-MICHAEL) 600 MG tablet, Take 600 mg by mouth Daily., Disp: , Rfl:   •  Cyanocobalamin (VITAMIN B-12 ER PO), Take 1 tablet by mouth Daily. ON HOLD, Disp: , Rfl:   •  Ferrous Gluconate (IRON 27 PO), Take  by mouth., Disp: , Rfl:   •  GLUCOSAMINE SULFATE PO, Take 1,000 mg by mouth Daily., Disp: , Rfl:   •  Omega-3 Fatty Acids (FISH OIL) 1000 MG capsule capsule, Take 1,000 mg by mouth Daily With Breakfast. ON HOLD, Disp: , Rfl:   •  thiamine (VITAMIN B1) 100 MG tablet, Take 100 mg by mouth Daily. ON HOLD, Disp: , Rfl:   •  Vitamin A 2400 MCG (8000 UT) tablet, Take  by mouth., Disp: , Rfl:   •  vitamin D3 (Vitamin D) 125 MCG (5000 UT) capsule capsule, Take 5,000 Units by mouth Daily., Disp: , Rfl:   •  vitamin E 400 UNIT capsule, Take 400 Units by mouth Daily., Disp: , Rfl:   •  donepezil (Aricept) 5 MG tablet, Take 1 tablet by mouth Every Night., Disp: 30 tablet, Rfl: 3  •  LORazepam (Ativan) 0.5 MG tablet, Take 1 tablet by mouth Daily., Disp: 3 tablet, Rfl: 0     Review of System  Review of Systems   Eyes: Negative.    Respiratory: Negative.    Cardiovascular: Negative.    Gastrointestinal: Negative.    Endocrine: Negative.    Genitourinary: Negative.      I have reviewed and confirmed the accuracy of the ROS as documented by the MA/LPN/RN Bon Abebe MD    Vitals:    21 1458   BP: 130/70   Resp: 16     Body mass index is 29.19 kg/m².    Objective     Physical Exam  Physical Exam  Cardiovascular:      Rate and Rhythm: Normal rate and regular rhythm.      Pulses: Normal pulses.      Heart sounds: Normal heart sounds.   Pulmonary:      Effort: Pulmonary effort is normal.      Breath sounds:  Normal breath sounds.   Abdominal:      General: Abdomen is flat.      Palpations: Abdomen is soft.   Musculoskeletal:         General: Normal range of motion.      Cervical back: Normal range of motion and neck supple.         Assessment/Plan    This 76-year-old patient presents at this time accompanied by his wife who provides historical information.  The patient was scheduled for follow-up for an MRI which was scheduled several weeks ago however the wife informs me that due to some scheduling problems this has not been done.  Also the patient is reluctant to undergo an MRI of the head and he relates that he has claustrophobia.  We have discussed with him that this MRI is open MRI will also enhance this with some lorazepam to be given 30 minutes before the procedure.    Also the patient has not picked up his Aricept medication for memory.  We will reschedule him for another visit and evaluation.            Diagnoses and all orders for this visit:    1. Memory loss (Primary)    2. Claustrophobia  -     LORazepam (Ativan) 0.5 MG tablet; Take 1 tablet by mouth Daily.  Dispense: 3 tablet; Refill: 0      Plan:  1.)  Lorazepam 0.5 mg 1 tablet 30 minutes before open MRI procedure  2.)  Patient is urged to  his prescription for Aricept 5 mg p.o. nightly as prescribed.  3.)  Follow-up for follow-up in 3 to 4 weeks.       Bon Abebe MD  09/09/2021

## 2021-09-20 ENCOUNTER — HOSPITAL ENCOUNTER (OUTPATIENT)
Dept: MRI IMAGING | Facility: HOSPITAL | Age: 76
Discharge: HOME | End: 2021-09-20
Admitting: INTERNAL MEDICINE

## 2021-09-20 DIAGNOSIS — R41.3 MEMORY LOSS: ICD-10-CM

## 2021-09-20 PROCEDURE — 70551 MRI BRAIN STEM W/O DYE: CPT

## 2021-10-07 ENCOUNTER — OFFICE VISIT (OUTPATIENT)
Dept: FAMILY MEDICINE CLINIC | Facility: CLINIC | Age: 76
End: 2021-10-07

## 2021-10-07 VITALS
RESPIRATION RATE: 16 BRPM | BODY MASS INDEX: 28.99 KG/M2 | HEIGHT: 63 IN | DIASTOLIC BLOOD PRESSURE: 78 MMHG | SYSTOLIC BLOOD PRESSURE: 140 MMHG | WEIGHT: 163.6 LBS

## 2021-10-07 DIAGNOSIS — I67.9 SMALL VESSEL DISEASE, CEREBROVASCULAR: Chronic | ICD-10-CM

## 2021-10-07 DIAGNOSIS — R41.3 MEMORY LOSS: Primary | Chronic | ICD-10-CM

## 2021-10-07 PROCEDURE — 99214 OFFICE O/P EST MOD 30 MIN: CPT | Performed by: INTERNAL MEDICINE

## 2021-10-07 RX ORDER — CLOPIDOGREL BISULFATE 75 MG/1
75 TABLET ORAL DAILY
Qty: 30 TABLET | Refills: 3 | Status: SHIPPED | OUTPATIENT
Start: 2021-10-07 | End: 2022-02-14

## 2021-10-07 RX ORDER — ATORVASTATIN CALCIUM 40 MG/1
TABLET, FILM COATED ORAL
Qty: 90 TABLET | Refills: 2 | Status: SHIPPED | OUTPATIENT
Start: 2021-10-07 | End: 2022-10-17

## 2021-10-11 NOTE — PROGRESS NOTES
10/07/2021    CC: Memory Loss (f/u...no other issues)  .        HPI  Memory Loss  This is a chronic problem. The current episode started more than 1 year ago. The problem occurs constantly. The problem has been gradually improving. The treatment provided mild relief.        Delilah Coy is a 76 y.o. male.      The following portions of the patient's history were reviewed and updated as appropriate: allergies, current medications, past family history, past medical history, past social history, past surgical history and problem list.    Problem List  Patient Active Problem List   Diagnosis   • Infected hernioplasty mesh (HCC)   • Essential hypertension   • Abdominal wall abscess at site of surgical wound   • Alcohol withdrawal (HCC)   • GERD (gastroesophageal reflux disease)   • Hyperlipidemia   • Marijuana use   • Obesity (BMI 30-39.9)       Past Medical History  Past Medical History:   Diagnosis Date   • Hernia of abdominal wall    • Hyperlipidemia    • Hypertension    • Wears partial dentures     UPPER       Surgical History  Past Surgical History:   Procedure Laterality Date   • APPENDECTOMY     • COLONOSCOPY N/A 8/8/2019    Procedure: COLONOSCOPY to Cecum;  Surgeon: Bon Kong Jr., MD;  Location: [facility] ENDOSCOPY;  Service: General   • EXCISION MASS TRUNK N/A 10/2/2019    Procedure: ABDOMINAL WALL EXPLORATION WITH FOREIGN BODY REMOVAL;  Surgeon: Bon Kong Jr., MD;  Location: [facility] OR;  Service: General   • HERNIA REPAIR     • VENTRAL/INCISIONAL HERNIA REPAIR N/A 8/21/2019    Procedure: exploratory laparotomy with mesh removal;  Surgeon: Bon Kong Jr., MD;  Location: [facility] MAIN OR;  Service: General       Family History  Family History   Problem Relation Age of Onset   • Heart disease Mother    • Malig Hyperthermia Neg Hx        Social History  Social History    Tobacco Use      Smoking status: Never Smoker      Smokeless tobacco: Former User       Is the Patient a current  tobacco user? No    Allergies  Allergies   Allergen Reactions   • Latex Itching       Current Medications    Current Outpatient Medications:   •  calcium carbonate (OS-MICHAEL) 600 MG tablet, Take 600 mg by mouth Daily., Disp: , Rfl:   •  Cyanocobalamin (VITAMIN B-12 ER PO), Take 1 tablet by mouth Daily. ON HOLD, Disp: , Rfl:   •  donepezil (Aricept) 5 MG tablet, Take 1 tablet by mouth Every Night., Disp: 30 tablet, Rfl: 3  •  Ferrous Gluconate (IRON 27 PO), Take  by mouth., Disp: , Rfl:   •  GLUCOSAMINE SULFATE PO, Take 1,000 mg by mouth Daily., Disp: , Rfl:   •  LORazepam (Ativan) 0.5 MG tablet, Take 1 tablet by mouth Daily., Disp: 3 tablet, Rfl: 0  •  Omega-3 Fatty Acids (FISH OIL) 1000 MG capsule capsule, Take 1,000 mg by mouth Daily With Breakfast. ON HOLD, Disp: , Rfl:   •  thiamine (VITAMIN B1) 100 MG tablet, Take 100 mg by mouth Daily. ON HOLD, Disp: , Rfl:   •  Vitamin A 2400 MCG (8000 UT) tablet, Take  by mouth., Disp: , Rfl:   •  vitamin D3 (Vitamin D) 125 MCG (5000 UT) capsule capsule, Take 5,000 Units by mouth Daily., Disp: , Rfl:   •  vitamin E 400 UNIT capsule, Take 400 Units by mouth Daily., Disp: , Rfl:   •  atorvastatin (LIPITOR) 40 MG tablet, 1 daily, Disp: 90 tablet, Rfl: 2  •  clopidogrel (Plavix) 75 MG tablet, Take 1 tablet by mouth Daily., Disp: 30 tablet, Rfl: 3     Review of System  Review of Systems   Constitutional: Negative.    Respiratory: Negative.    Cardiovascular: Negative.    Gastrointestinal: Negative.    Neurological: Positive for memory problem.     I have reviewed and confirmed the accuracy of the ROS as documented by the MA/LPN/RN Bon Abebe MD    Vitals:    10/07/21 1458   BP: 140/78   Resp: 16     Body mass index is 28.98 kg/m².    Objective     Physical Exam  Physical Exam  Constitutional:       Appearance: Normal appearance.   HENT:      Head: Normocephalic and atraumatic.   Cardiovascular:      Rate and Rhythm: Normal rate and regular rhythm.      Heart sounds: Normal  heart sounds.   Pulmonary:      Breath sounds: Normal breath sounds.   Neurological:      Mental Status: He is alert.         Assessment/Plan      This pleasant 76-year-old presents today accompanied by his wife who gives historical information.  With the past few visits we have been concerned with the patient's memory and in the interim the patient had an MRI.  The couple is here to discuss those results.    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    I am concerned about the patient's small vessel infarcts and memory loss.  We have previously started him on Aricept 5 mg p.o. daily.    We will obtain a second opinion from neurology and refer the patient out for psychological testing through the [facility] group.          Diagnoses and all orders for this visit:    1. Memory loss (Primary)  Comments:  Currently under treatment with Aricept    2. Second opinion regarding treatment pending  Comments:  Second opinion regarding treatment pending    Other orders  -     clopidogrel (Plavix) 75 MG tablet; Take 1 tablet by mouth Daily.  Dispense: 30 tablet; Refill: 3  -     atorvastatin (LIPITOR) 40 MG tablet; 1 daily  Dispense: 90 tablet; Refill: 2      Plan:  1.)  Referral made to neurology and psychology regarding small vessel disease and memory loss       Bon Abebe MD  10/07/2021

## 2021-10-21 ENCOUNTER — TELEPHONE (OUTPATIENT)
Dept: FAMILY MEDICINE CLINIC | Facility: CLINIC | Age: 76
End: 2021-10-21

## 2021-10-21 NOTE — TELEPHONE ENCOUNTER
Hub please inform patient    LEFT MESSAGE FOR CLARIFICATION.  We need more information on the issue she is calling about.  Also, if it is pertaining to her, then the message will need to be in her chart.

## 2021-10-21 NOTE — TELEPHONE ENCOUNTER
Caller: danielle rudolph    Relationship: Emergency Contact    Best call back number: [phone]    What is the best time to reach you: PRIOR TO 3 P.M.    Who are you requesting to speak with (clinical staff, provider,  specific staff member): DR HUDSON    PATIENT'S WIFE STATES THAT SHE NEEDS TO SPEAK TO DR HUDSON REGARDING HER APPOINTMENT WITH OBGYN AND THE NEED FOR AN ANTIBIOTIC FOR THE PATIENT REGARDING THIS.  SHE STATES SHE WORKS AT 3 TODAY AND WOULD LIKE A CALL BEFORE THAT IF POSSIBLE.

## 2021-10-22 ENCOUNTER — TELEPHONE (OUTPATIENT)
Dept: FAMILY MEDICINE CLINIC | Facility: CLINIC | Age: 76
End: 2021-10-22

## 2021-10-22 RX ORDER — METRONIDAZOLE 500 MG/1
500 TABLET ORAL 3 TIMES DAILY
Qty: 14 TABLET | Refills: 0 | Status: SHIPPED | OUTPATIENT
Start: 2021-10-22

## 2021-10-22 NOTE — TELEPHONE ENCOUNTER
Patient wife was diagnosed with TRICHOMONAS.  She is being treated with Flagyl 500mg bid x7 days.    Her GYN said that her  needs to be treated as well.  Does he need to be tested?    [phone]  Permission given to leave detailed message.

## 2022-01-10 ENCOUNTER — OFFICE VISIT (OUTPATIENT)
Dept: FAMILY MEDICINE CLINIC | Facility: CLINIC | Age: 77
End: 2022-01-10

## 2022-01-10 VITALS
TEMPERATURE: 98.6 F | DIASTOLIC BLOOD PRESSURE: 80 MMHG | RESPIRATION RATE: 16 BRPM | BODY MASS INDEX: 28.74 KG/M2 | HEIGHT: 63 IN | WEIGHT: 162.2 LBS | SYSTOLIC BLOOD PRESSURE: 140 MMHG

## 2022-01-10 DIAGNOSIS — R41.3 MEMORY DEFICITS: Chronic | ICD-10-CM

## 2022-01-10 DIAGNOSIS — R05.9 COUGH: Primary | ICD-10-CM

## 2022-01-10 PROCEDURE — 99213 OFFICE O/P EST LOW 20 MIN: CPT | Performed by: INTERNAL MEDICINE

## 2022-01-10 RX ORDER — DONEPEZIL HYDROCHLORIDE 10 MG/1
10 TABLET, FILM COATED ORAL NIGHTLY
Qty: 30 TABLET | Refills: 3 | Status: SHIPPED | OUTPATIENT
Start: 2022-01-10

## 2022-01-10 NOTE — PROGRESS NOTES
01/10/2022    CC: Memory Loss (f/u) and Cough (almost gone)  .        HPI  Memory Loss  This is a chronic problem. The current episode started more than 1 year ago. The problem occurs constantly. The problem has been unchanged. Associated symptoms include coughing. Nothing aggravates the symptoms.   Cough         Subjective   Reggie Cyo is a 76 y.o. male.      The following portions of the patient's history were reviewed and updated as appropriate: allergies, current medications, past family history, past medical history, past social history, past surgical history and problem list.    Problem List  Patient Active Problem List   Diagnosis   • Infected hernioplasty mesh (HCC)   • Essential hypertension   • Abdominal wall abscess at site of surgical wound   • Alcohol withdrawal (HCC)   • GERD (gastroesophageal reflux disease)   • Hyperlipidemia   • Marijuana use   • Obesity (BMI 30-39.9)       Past Medical History  Past Medical History:   Diagnosis Date   • Hernia of abdominal wall    • Hyperlipidemia    • Hypertension    • Wears partial dentures     UPPER       Surgical History  Past Surgical History:   Procedure Laterality Date   • APPENDECTOMY     • COLONOSCOPY N/A 8/8/2019    Procedure: COLONOSCOPY to Cecum;  Surgeon: Bon Kong Jr., MD;  Location: [facility] ENDOSCOPY;  Service: General   • EXCISION MASS TRUNK N/A 10/2/2019    Procedure: ABDOMINAL WALL EXPLORATION WITH FOREIGN BODY REMOVAL;  Surgeon: Bon Kong Jr., MD;  Location: [facility] OR;  Service: General   • HERNIA REPAIR     • VENTRAL/INCISIONAL HERNIA REPAIR N/A 8/21/2019    Procedure: exploratory laparotomy with mesh removal;  Surgeon: Bon Kong Jr., MD;  Location: [facility] OR;  Service: General       Family History  Family History   Problem Relation Age of Onset   • Heart disease Mother    • Malig Hyperthermia Neg Hx        Social History  Social History    Tobacco Use      Smoking status: Never Smoker      Smokeless tobacco: Former  User       Is the Patient a current tobacco user? No    Allergies  Allergies   Allergen Reactions   • Latex Itching       Current Medications    Current Outpatient Medications:   •  atorvastatin (LIPITOR) 40 MG tablet, 1 daily, Disp: 90 tablet, Rfl: 2  •  calcium carbonate (OS-MICHAEL) 600 MG tablet, Take 600 mg by mouth Daily., Disp: , Rfl:   •  clopidogrel (Plavix) 75 MG tablet, Take 1 tablet by mouth Daily., Disp: 30 tablet, Rfl: 3  •  Cyanocobalamin (VITAMIN B-12 ER PO), Take 1 tablet by mouth Daily. ON HOLD, Disp: , Rfl:   •  donepezil (Aricept) 5 MG tablet, Take 1 tablet by mouth Every Night., Disp: 30 tablet, Rfl: 3  •  Ferrous Gluconate (IRON 27 PO), Take  by mouth., Disp: , Rfl:   •  GLUCOSAMINE SULFATE PO, Take 1,000 mg by mouth Daily., Disp: , Rfl:   •  LORazepam (Ativan) 0.5 MG tablet, Take 1 tablet by mouth Daily., Disp: 3 tablet, Rfl: 0  •  metroNIDAZOLE (Flagyl) 500 MG tablet, Take 1 tablet by mouth 3 (Three) Times a Day., Disp: 14 tablet, Rfl: 0  •  Omega-3 Fatty Acids (FISH OIL) 1000 MG capsule capsule, Take 1,000 mg by mouth Daily With Breakfast. ON HOLD, Disp: , Rfl:   •  thiamine (VITAMIN B1) 100 MG tablet, Take 100 mg by mouth Daily. ON HOLD, Disp: , Rfl:   •  Vitamin A 2400 MCG (8000 UT) tablet, Take  by mouth., Disp: , Rfl:   •  vitamin D3 (Vitamin D) 125 MCG (5000 UT) capsule capsule, Take 5,000 Units by mouth Daily., Disp: , Rfl:   •  vitamin E 400 UNIT capsule, Take 400 Units by mouth Daily., Disp: , Rfl:      Review of System  Review of Systems   Constitutional: Negative.    HENT: Negative.    Eyes: Negative.    Respiratory: Positive for cough.    Neurological: Positive for memory problem.     I have reviewed and confirmed the accuracy of the ROS as documented by the MA/LPN/RN Bon Abebe MD    Vitals:    01/10/22 1059   BP: 140/80   Resp: 16   Temp: 98.6 °F (37 °C)     Body mass index is 28.73 kg/m².    Objective     Physical Exam  Physical Exam  Constitutional:       Appearance: Normal  appearance.   HENT:      Head: Normocephalic.   Pulmonary:      Effort: Pulmonary effort is normal.   Musculoskeletal:      Cervical back: Normal range of motion.   Neurological:      Mental Status: He is alert.         Assessment/Plan      This 76-year-old patient presents today accompanied by his wife who provides historical information. Patient was started on Aricept several weeks ago. His wife relates that she has noticed no noticeable improvement in his memory. The patient relates he feels fine having had no particular problems in the interim of visits with exception of a persistent cough over the past several days. This cough is nonproductive. But in light of the pandemic and the increasing infection rate will screen the patient for COVID-19 as well.    A test of the patient's remote shows that it remains intact while his recent memory remains essentially unchanged.    Patient denies any chills or fever over the past 48 hours. But his wife has a nonproductive cough is been present for the past several days. She has not recently checked for Covid.        Diagnoses and all orders for this visit:    1. Cough (Primary)  -     COVID-19,[facility] ROUTINE, NP/OP SWAB IN TRANSPORT MEDIA OR ESWAB 72 HR TAT - Swab, Anterior nasal; Future  -     COVID-19,[facility] ROUTINE, NP/OP SWAB IN TRANSPORT MEDIA OR ESWAB 72 HR TAT - Swab, Anterior nasal    2. Memory deficits      On Aricept 5 mg plan:  1.) Increase Aricept to 10 mg p.o. nightly.  2.) Follow-up in 4 to 6 weeks.       Bon Abebe MD  01/10/2022

## 2022-01-12 ENCOUNTER — TELEPHONE (OUTPATIENT)
Dept: FAMILY MEDICINE CLINIC | Facility: CLINIC | Age: 77
End: 2022-01-12

## 2022-01-12 LAB
LABCORP SARS-COV-2, NAA 2 DAY TAT: NORMAL
SARS-COV-2 RNA RESP QL NAA+PROBE: DETECTED

## 2022-01-12 NOTE — TELEPHONE ENCOUNTER
Caller: jelani rudolph    Relationship: Emergency Contact    Best call back number:    Caller requesting test results: PTS WIFE    What test was performed: COVID TEST    When was the test performed: 01/10/22    Where was the test performed: OFFICE    Additional notes: PATIENTS WIFE JELANI IS CALLING IN TO GET THE COVID RESULTS FOR THE PATIENT.

## 2022-02-14 RX ORDER — CLOPIDOGREL BISULFATE 75 MG/1
TABLET ORAL
Qty: 30 TABLET | Refills: 3 | Status: SHIPPED | OUTPATIENT
Start: 2022-02-14

## 2022-03-14 ENCOUNTER — TELEPHONE (OUTPATIENT)
Dept: NEUROLOGY | Facility: CLINIC | Age: 77
End: 2022-03-14

## 2022-03-14 NOTE — TELEPHONE ENCOUNTER
Attempted to call pt to inform that appt on 03/29/22 with Dr Randle has been r/s to 04/14/22 at 8 am. No answer and vm full. Mailed appt reminder.

## 2022-04-05 ENCOUNTER — TELEPHONE (OUTPATIENT)
Dept: FAMILY MEDICINE CLINIC | Facility: CLINIC | Age: 77
End: 2022-04-05

## 2022-04-05 NOTE — TELEPHONE ENCOUNTER
Caller: klaudia danielle    Relationship: Emergency Contact    Best call back number: [phone]    What medication are you requesting: ANY    What are your current symptoms: EARACHE, RINGING IN LEFT EAR, CANNOT BARELY HEAR OUT OF IT    How long have you been experiencing symptoms: TWO WEEKS     Have you had these symptoms before:    [] Yes  [x] No    Have you been treated for these symptoms before:   [] Yes  [x] No    If a prescription is needed, what is your preferred pharmacy and phone number:      [address] AT [facility] & ([place] )  [phone] [facility] [phone] FX        Additional notes: PATIENT'S WIFE WAS CALLING IN TO SEE WHAT DR HUDSON WOULD RECOMMEND THAT PATIENT NEED TO DO. PATIENT'S WIFE WANTED TO KNOW IF DR HUDSON COULD CALL SOMETHING IN TO HELP WITH PATIENT'S SYMPTOMS OR IF HE WOULD NEED AN APPOINTMENT FIRST. PLEASE ADVISE.

## 2022-10-17 RX ORDER — METHION/INOS/CHOL BT/B COM/LIV 110MG-86MG
CAPSULE ORAL
Qty: 30 TABLET | Refills: 2 | Status: SHIPPED | OUTPATIENT
Start: 2022-10-17

## 2022-10-17 RX ORDER — ATORVASTATIN CALCIUM 40 MG/1
TABLET, FILM COATED ORAL
Qty: 90 TABLET | Refills: 2 | Status: SHIPPED | OUTPATIENT
Start: 2022-10-17

## 2022-12-06 ENCOUNTER — OFFICE VISIT (OUTPATIENT)
Dept: FAMILY MEDICINE CLINIC | Facility: CLINIC | Age: 77
End: 2022-12-06

## 2022-12-06 VITALS
SYSTOLIC BLOOD PRESSURE: 152 MMHG | DIASTOLIC BLOOD PRESSURE: 90 MMHG | WEIGHT: 158 LBS | HEART RATE: 101 BPM | HEIGHT: 63 IN | BODY MASS INDEX: 28 KG/M2 | OXYGEN SATURATION: 99 %

## 2022-12-06 DIAGNOSIS — E78.41 ELEVATED LIPOPROTEIN(A): ICD-10-CM

## 2022-12-06 DIAGNOSIS — Z23 NEED FOR VACCINATION: Primary | ICD-10-CM

## 2022-12-06 DIAGNOSIS — Z13.6 SCREENING FOR HYPERTENSION: ICD-10-CM

## 2022-12-06 DIAGNOSIS — G30.9 MODERATE ALZHEIMER'S DEMENTIA WITH PSYCHOTIC DISTURBANCE, UNSPECIFIED TIMING OF DEMENTIA ONSET: ICD-10-CM

## 2022-12-06 DIAGNOSIS — R35.1 NOCTURIA: ICD-10-CM

## 2022-12-06 DIAGNOSIS — Z13.29 SCREENING FOR HYPOTHYROIDISM: ICD-10-CM

## 2022-12-06 DIAGNOSIS — F22 DELUSIONS: ICD-10-CM

## 2022-12-06 DIAGNOSIS — R41.3 MEMORY LOSS: ICD-10-CM

## 2022-12-06 DIAGNOSIS — Z13.0 SCREENING FOR DEFICIENCY ANEMIA: ICD-10-CM

## 2022-12-06 DIAGNOSIS — F02.B2 MODERATE ALZHEIMER'S DEMENTIA WITH PSYCHOTIC DISTURBANCE, UNSPECIFIED TIMING OF DEMENTIA ONSET: ICD-10-CM

## 2022-12-06 PROCEDURE — 99214 OFFICE O/P EST MOD 30 MIN: CPT | Performed by: INTERNAL MEDICINE

## 2022-12-06 PROCEDURE — 91312 COVID-19 (PFIZER) BIVALENT BOOSTER 12+YRS: CPT | Performed by: INTERNAL MEDICINE

## 2022-12-06 PROCEDURE — 0124A COVID-19 (PFIZER) BIVALENT BOOSTER 12+YRS: CPT | Performed by: INTERNAL MEDICINE

## 2022-12-06 NOTE — PROGRESS NOTES
12/06/2022    CC: Memory Loss (... no other issues ) and Hallucinations  .        HPI  History of Present Illness  This 77-year-old patient presents at this time accompanied by his wife who gives historical information.  She relates increasing concerns about his hallucinations and his paranoia.  Memory Loss  This is a chronic problem. The current episode started more than 1 month ago. The problem occurs intermittently. The problem has been unchanged. The symptoms are aggravated by drinking. The treatment provided mild relief.   Hallucinations         Subjective   Reggie Coy is a 77 y.o. male.      The following portions of the patient's history were reviewed and updated as appropriate: allergies, current medications, past family history, past medical history, past social history, past surgical history and problem list.    Problem List  Patient Active Problem List   Diagnosis   • Infected hernioplasty mesh (HCC)   • Essential hypertension   • Abdominal wall abscess at site of surgical wound   • Alcohol withdrawal (HCC)   • GERD (gastroesophageal reflux disease)   • Hyperlipidemia   • Marijuana use   • Obesity (BMI 30-39.9)       Past Medical History  Past Medical History:   Diagnosis Date   • Hernia of abdominal wall    • Hyperlipidemia    • Hypertension    • Wears partial dentures     UPPER       Surgical History  Past Surgical History:   Procedure Laterality Date   • APPENDECTOMY     • COLONOSCOPY N/A 8/8/2019    Procedure: COLONOSCOPY to Cecum;  Surgeon: Bon Kong Jr., MD;  Location: [facility] ENDOSCOPY;  Service: General   • EXCISION MASS TRUNK N/A 10/2/2019    Procedure: ABDOMINAL WALL EXPLORATION WITH FOREIGN BODY REMOVAL;  Surgeon: Bon Kong Jr., MD;  Location: [facility] OR;  Service: General   • HERNIA REPAIR     • VENTRAL/INCISIONAL HERNIA REPAIR N/A 8/21/2019    Procedure: exploratory laparotomy with mesh removal;  Surgeon: Bon Kong Jr., MD;  Location: [facility] OR;  Service: General        Family History  Family History   Problem Relation Age of Onset   • Heart disease Mother    • Malig Hyperthermia Neg Hx        Social History  Social History    Tobacco Use      Smoking status: Every Day        Types: Cigarettes      Smokeless tobacco: Former        Quit date: 8/16/1995       Is the Patient a current tobacco user? No    Allergies  Allergies   Allergen Reactions   • Latex Itching       Current Medications    Current Outpatient Medications:   •  atorvastatin (LIPITOR) 40 MG tablet, TAKE ONE TABLET BY MOUTH DAILY, Disp: 90 tablet, Rfl: 2  •  clopidogrel (PLAVIX) 75 MG tablet, TAKE ONE TABLET BY MOUTH DAILY, Disp: 30 tablet, Rfl: 3  •  donepezil (Aricept) 10 MG tablet, Take 1 tablet by mouth Every Night., Disp: 30 tablet, Rfl: 3  •  Multiple Vitamins-Minerals (MULTI COMPLETE PO), Take  by mouth Daily., Disp: , Rfl:   •  calcium carbonate (OS-MICHAEL) 600 MG tablet, Take 600 mg by mouth Daily., Disp: , Rfl:   •  Cyanocobalamin (VITAMIN B-12 ER PO), Take 1 tablet by mouth Daily. ON HOLD, Disp: , Rfl:   •  Ferrous Gluconate (IRON 27 PO), Take  by mouth., Disp: , Rfl:   •  GLUCOSAMINE SULFATE PO, Take 1,000 mg by mouth Daily., Disp: , Rfl:   •  LORazepam (Ativan) 0.5 MG tablet, Take 1 tablet by mouth Daily., Disp: 3 tablet, Rfl: 0  •  metroNIDAZOLE (Flagyl) 500 MG tablet, Take 1 tablet by mouth 3 (Three) Times a Day., Disp: 14 tablet, Rfl: 0  •  Omega-3 Fatty Acids (FISH OIL) 1000 MG capsule capsule, Take 1,000 mg by mouth Daily With Breakfast. ON HOLD, Disp: , Rfl:   •  thiamine (VITAMIN B-1) 100 MG tablet tablet, TAKE ONE TABLET BY MOUTH DAILY, Disp: 30 tablet, Rfl: 2  •  Vitamin A 2400 MCG (8000 UT) tablet, Take  by mouth., Disp: , Rfl:   •  vitamin D3 125 MCG (5000 UT) capsule capsule, Take 5,000 Units by mouth Daily., Disp: , Rfl:   •  vitamin E 400 UNIT capsule, Take 400 Units by mouth Daily., Disp: , Rfl:      Review of System  Review of Systems   Constitutional: Negative.    HENT: Negative.     Eyes: Negative.    Respiratory: Negative.    Cardiovascular: Negative.    Endocrine: Negative.    Genitourinary: Negative.    Neurological: Positive for memory problem.   Psychiatric/Behavioral: Positive for hallucinations.     I have reviewed and confirmed the accuracy of the ROS as documented by the MA/LPN/RN Bon Abebe MD    Vitals:    12/06/22 0913   BP: 152/90   Pulse: 101   SpO2: 99%     Body mass index is 28 kg/m².    Objective     Physical Exam  Physical Exam  Constitutional:       Appearance: Normal appearance.   HENT:      Head: Normocephalic and atraumatic.   Cardiovascular:      Rate and Rhythm: Normal rate and regular rhythm.      Pulses: Normal pulses.      Heart sounds: Normal heart sounds.   Pulmonary:      Effort: Pulmonary effort is normal.      Breath sounds: Normal breath sounds.   Neurological:      Mental Status: He is alert.         Assessment & Plan      This 77-year-old patient presents at this time accompanied by his wife who gives historical information.  She relates that she is very concerned because he is feeling that doctors are trying to poison him.  This is being done to his medication and so he is refused to take it at times.  She relates his head he has had episodes of delusions and hallucinations where he seen and sees animals crawling on the walls etc.  He is diagnosed with Alzheimer's but has refused follow-up with neurology.  He has missed several appointments and I discussed the importance of becoming more regular.    We will see the patient back for reevaluation of his [facility] evaluation and make a referral to behavioral medicine for evaluation of the psychosis.    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  His wife relates he is taking his medications as prescribed.  Diagnoses and all orders for this visit:    1. Need for vaccination (Primary)  -     COVID-19 Bivalent Booster (Pfizer) 12+yrs    2. Memory loss    3. Delusions  (HCC)    4. Screening for deficiency anemia    5. Screening for hypertension    6. Nocturia    7. Moderate Alzheimer's dementia with psychotic disturbance, unspecified timing of dementia onset (HCC)    8. Screening for hypothyroidism    9. Elevated lipoprotein(a)         Plan:  1.)  Comprehensive metabolic profile, CBC, lipid profile  2.)  We urged him to reestablish his appointment with neurology for evaluation of his Alzheimer's.  3.)  Referral was made made to behavioral medicine for evaluation of his psychosis.  4.)  Follow-up in the next several weeks with a reevaluation with a [facility] evaluation of his memory.    Bon Abebe MD  12/06/2022

## 2022-12-07 LAB
ALBUMIN SERPL-MCNC: 4.5 G/DL (ref 3.5–5.2)
ALBUMIN/GLOB SERPL: 1.9 G/DL
ALP SERPL-CCNC: 86 U/L (ref 39–117)
ALT SERPL-CCNC: 32 U/L (ref 1–41)
AST SERPL-CCNC: 56 U/L (ref 1–40)
BASOPHILS # BLD AUTO: 0.06 10*3/MM3 (ref 0–0.2)
BASOPHILS NFR BLD AUTO: 0.6 % (ref 0–1.5)
BILIRUB SERPL-MCNC: 0.3 MG/DL (ref 0–1.2)
BUN SERPL-MCNC: 10 MG/DL (ref 8–23)
BUN/CREAT SERPL: 14.1 (ref 7–25)
CALCIUM SERPL-MCNC: 9.7 MG/DL (ref 8.6–10.5)
CHLORIDE SERPL-SCNC: 101 MMOL/L (ref 98–107)
CHOLEST SERPL-MCNC: 189 MG/DL (ref 0–200)
CHOLEST/HDLC SERPL: 2.49 {RATIO}
CO2 SERPL-SCNC: 26.1 MMOL/L (ref 22–29)
CREAT SERPL-MCNC: 0.71 MG/DL (ref 0.76–1.27)
EGFRCR SERPLBLD CKD-EPI 2021: 95.1 ML/MIN/1.73
EOSINOPHIL # BLD AUTO: 0.12 10*3/MM3 (ref 0–0.4)
EOSINOPHIL NFR BLD AUTO: 1.2 % (ref 0.3–6.2)
ERYTHROCYTE [DISTWIDTH] IN BLOOD BY AUTOMATED COUNT: 12.4 % (ref 12.3–15.4)
GLOBULIN SER CALC-MCNC: 2.4 GM/DL
GLUCOSE SERPL-MCNC: 108 MG/DL (ref 65–99)
HCT VFR BLD AUTO: 39.2 % (ref 37.5–51)
HDLC SERPL-MCNC: 76 MG/DL (ref 40–60)
HGB BLD-MCNC: 13.6 G/DL (ref 13–17.7)
IMM GRANULOCYTES # BLD AUTO: 0.03 10*3/MM3 (ref 0–0.05)
IMM GRANULOCYTES NFR BLD AUTO: 0.3 % (ref 0–0.5)
LDLC SERPL CALC-MCNC: 93 MG/DL (ref 0–100)
LYMPHOCYTES # BLD AUTO: 2.62 10*3/MM3 (ref 0.7–3.1)
LYMPHOCYTES NFR BLD AUTO: 25.6 % (ref 19.6–45.3)
MCH RBC QN AUTO: 33.1 PG (ref 26.6–33)
MCHC RBC AUTO-ENTMCNC: 34.7 G/DL (ref 31.5–35.7)
MCV RBC AUTO: 95.4 FL (ref 79–97)
MONOCYTES # BLD AUTO: 0.83 10*3/MM3 (ref 0.1–0.9)
MONOCYTES NFR BLD AUTO: 8.1 % (ref 5–12)
NEUTROPHILS # BLD AUTO: 6.58 10*3/MM3 (ref 1.7–7)
NEUTROPHILS NFR BLD AUTO: 64.2 % (ref 42.7–76)
NRBC BLD AUTO-RTO: 0.1 /100 WBC (ref 0–0.2)
PLATELET # BLD AUTO: 189 10*3/MM3 (ref 140–450)
POTASSIUM SERPL-SCNC: 4.3 MMOL/L (ref 3.5–5.2)
PROT SERPL-MCNC: 6.9 G/DL (ref 6–8.5)
RBC # BLD AUTO: 4.11 10*6/MM3 (ref 4.14–5.8)
SODIUM SERPL-SCNC: 139 MMOL/L (ref 136–145)
TRIGL SERPL-MCNC: 116 MG/DL (ref 0–150)
TSH SERPL DL<=0.005 MIU/L-ACNC: 2.21 UIU/ML (ref 0.27–4.2)
VLDLC SERPL CALC-MCNC: 20 MG/DL (ref 5–40)
WBC # BLD AUTO: 10.24 10*3/MM3 (ref 3.4–10.8)

## (undated) DEVICE — NDL HYPO ECLPS SFTY 22G 1 1/2IN

## (undated) DEVICE — NDL HYPO PRECISIONGLIDE REG 25G 1 1/2

## (undated) DEVICE — PK PROC MAJ 40

## (undated) DEVICE — APPL CHLORAPREP W/TINT 26ML ORNG

## (undated) DEVICE — THE TORRENT IRRIGATION SCOPE CONNECTOR IS USED WITH THE TORRENT IRRIGATION TUBING TO PROVIDE IRRIGATION FLUIDS SUCH AS STERILE WATER DURING GASTROINTESTINAL ENDOSCOPIC PROCEDURES WHEN USED IN CONJUNCTION WITH AN IRRIGATION PUMP (OR ELECTROSURGICAL UNIT).: Brand: TORRENT

## (undated) DEVICE — SUT PDS O CT1 CR/8 18IN Z740D

## (undated) DEVICE — GLV SURG PREMIERPRO ORTHO LTX PF SZ7.5 BRN

## (undated) DEVICE — SENSR O2 OXIMAX FNGR A/ 18IN NONSTR

## (undated) DEVICE — LOU MINOR PROCEDURE: Brand: MEDLINE INDUSTRIES, INC.

## (undated) DEVICE — STPLR SKIN VISISTAT WD 35CT

## (undated) DEVICE — ANTIBACTERIAL UNDYED BRAIDED (POLYGLACTIN 910), SYNTHETIC ABSORBABLE SUTURE: Brand: COATED VICRYL

## (undated) DEVICE — SUT VIC 4/0 SH 27IN J415H

## (undated) DEVICE — TUBING, SUCTION, 1/4" X 10', STRAIGHT: Brand: MEDLINE

## (undated) DEVICE — CANN NASL CO2 TRULINK W/O2 A/

## (undated) DEVICE — SYR LUERLOK 20CC BX/50

## (undated) DEVICE — PAD,ABDOMINAL,8"X10",ST,LF: Brand: MEDLINE

## (undated) DEVICE — SUPER SPONGES,MEDIUM: Brand: KERLIX

## (undated) DEVICE — Device: Brand: DEFENDO AIR/WATER/SUCTION AND BIOPSY VALVE